# Patient Record
Sex: MALE | Race: WHITE | NOT HISPANIC OR LATINO | URBAN - METROPOLITAN AREA
[De-identification: names, ages, dates, MRNs, and addresses within clinical notes are randomized per-mention and may not be internally consistent; named-entity substitution may affect disease eponyms.]

---

## 2018-01-08 ENCOUNTER — EMERGENCY (EMERGENCY)
Facility: HOSPITAL | Age: 33
LOS: 1 days | Discharge: HOME | End: 2018-01-08
Attending: EMERGENCY MEDICINE | Admitting: EMERGENCY MEDICINE
Payer: COMMERCIAL

## 2018-01-08 VITALS
TEMPERATURE: 99 F | HEART RATE: 91 BPM | RESPIRATION RATE: 20 BRPM | DIASTOLIC BLOOD PRESSURE: 77 MMHG | OXYGEN SATURATION: 99 % | SYSTOLIC BLOOD PRESSURE: 136 MMHG

## 2018-01-08 VITALS
TEMPERATURE: 98 F | OXYGEN SATURATION: 100 % | HEART RATE: 90 BPM | SYSTOLIC BLOOD PRESSURE: 140 MMHG | DIASTOLIC BLOOD PRESSURE: 70 MMHG | RESPIRATION RATE: 16 BRPM

## 2018-01-08 LAB
ALBUMIN SERPL ELPH-MCNC: 4 G/DL — SIGNIFICANT CHANGE UP (ref 3.3–5)
ALP SERPL-CCNC: 44 U/L — SIGNIFICANT CHANGE UP (ref 40–120)
ALT FLD-CCNC: 54 U/L — HIGH (ref 4–41)
APTT BLD: 25.8 SEC — LOW (ref 27.5–37.4)
AST SERPL-CCNC: 41 U/L — HIGH (ref 4–40)
BASOPHILS # BLD AUTO: 0.02 K/UL — SIGNIFICANT CHANGE UP (ref 0–0.2)
BASOPHILS NFR BLD AUTO: 0.2 % — SIGNIFICANT CHANGE UP (ref 0–2)
BILIRUB SERPL-MCNC: 0.3 MG/DL — SIGNIFICANT CHANGE UP (ref 0.2–1.2)
BLD GP AB SCN SERPL QL: NEGATIVE — SIGNIFICANT CHANGE UP
BUN SERPL-MCNC: 16 MG/DL — SIGNIFICANT CHANGE UP (ref 7–23)
CALCIUM SERPL-MCNC: 8.8 MG/DL — SIGNIFICANT CHANGE UP (ref 8.4–10.5)
CHLORIDE SERPL-SCNC: 99 MMOL/L — SIGNIFICANT CHANGE UP (ref 98–107)
CO2 SERPL-SCNC: 25 MMOL/L — SIGNIFICANT CHANGE UP (ref 22–31)
CREAT SERPL-MCNC: 1.21 MG/DL — SIGNIFICANT CHANGE UP (ref 0.5–1.3)
EOSINOPHIL # BLD AUTO: 0.06 K/UL — SIGNIFICANT CHANGE UP (ref 0–0.5)
EOSINOPHIL NFR BLD AUTO: 0.5 % — SIGNIFICANT CHANGE UP (ref 0–6)
GLUCOSE SERPL-MCNC: 83 MG/DL — SIGNIFICANT CHANGE UP (ref 70–99)
HCT VFR BLD CALC: 46.8 % — SIGNIFICANT CHANGE UP (ref 39–50)
HGB BLD-MCNC: 15.7 G/DL — SIGNIFICANT CHANGE UP (ref 13–17)
IMM GRANULOCYTES # BLD AUTO: 0.04 # — SIGNIFICANT CHANGE UP
IMM GRANULOCYTES NFR BLD AUTO: 0.3 % — SIGNIFICANT CHANGE UP (ref 0–1.5)
INR BLD: 1.06 — SIGNIFICANT CHANGE UP (ref 0.88–1.17)
LYMPHOCYTES # BLD AUTO: 1.58 K/UL — SIGNIFICANT CHANGE UP (ref 1–3.3)
LYMPHOCYTES # BLD AUTO: 13.6 % — SIGNIFICANT CHANGE UP (ref 13–44)
MCHC RBC-ENTMCNC: 27.1 PG — SIGNIFICANT CHANGE UP (ref 27–34)
MCHC RBC-ENTMCNC: 33.5 % — SIGNIFICANT CHANGE UP (ref 32–36)
MCV RBC AUTO: 80.8 FL — SIGNIFICANT CHANGE UP (ref 80–100)
MONOCYTES # BLD AUTO: 1.43 K/UL — HIGH (ref 0–0.9)
MONOCYTES NFR BLD AUTO: 12.3 % — SIGNIFICANT CHANGE UP (ref 2–14)
NEUTROPHILS # BLD AUTO: 8.45 K/UL — HIGH (ref 1.8–7.4)
NEUTROPHILS NFR BLD AUTO: 73.1 % — SIGNIFICANT CHANGE UP (ref 43–77)
NRBC # FLD: 0 — SIGNIFICANT CHANGE UP
PLATELET # BLD AUTO: 282 K/UL — SIGNIFICANT CHANGE UP (ref 150–400)
PMV BLD: 11.4 FL — SIGNIFICANT CHANGE UP (ref 7–13)
POTASSIUM SERPL-MCNC: 4.6 MMOL/L — SIGNIFICANT CHANGE UP (ref 3.5–5.3)
POTASSIUM SERPL-SCNC: 4.6 MMOL/L — SIGNIFICANT CHANGE UP (ref 3.5–5.3)
PROT SERPL-MCNC: 7.4 G/DL — SIGNIFICANT CHANGE UP (ref 6–8.3)
PROTHROM AB SERPL-ACNC: 11.8 SEC — SIGNIFICANT CHANGE UP (ref 9.8–13.1)
RBC # BLD: 5.79 M/UL — SIGNIFICANT CHANGE UP (ref 4.2–5.8)
RBC # FLD: 15.9 % — HIGH (ref 10.3–14.5)
RH IG SCN BLD-IMP: NEGATIVE — SIGNIFICANT CHANGE UP
SODIUM SERPL-SCNC: 138 MMOL/L — SIGNIFICANT CHANGE UP (ref 135–145)
WBC # BLD: 11.58 K/UL — HIGH (ref 3.8–10.5)
WBC # FLD AUTO: 11.58 K/UL — HIGH (ref 3.8–10.5)

## 2018-01-08 PROCEDURE — 99284 EMERGENCY DEPT VISIT MOD MDM: CPT

## 2018-01-08 NOTE — ED PROVIDER NOTE - PROGRESS NOTE DETAILS
Katie JASMINE: Patient reassessed.  Doing well; no concerns.  PE does not show an erection at this time.  Has been over > 1 hour since the last procedure.  He has been urinating w/o difficulty.  Discharge instructions and follow-up discussed with patient. Klepfish: Cleared by  for outpt f/u (also requested just one dose abx). Comfortable for dc, outpt pmd/gu f/u.

## 2018-01-08 NOTE — ED PROVIDER NOTE - ATTENDING CONTRIBUTION TO CARE
I, Jennifer Cabot, MD, have performed a history and physical exam of the patient and discussed their management with the resident. I reviewed the resident's note and agree with the documented findings and plan of care. My medical decision making and observations are found above.    Cabot: 33M with no PMH on testosterone and anastrozole for improving his weight lifting who comes in with priapism x 12 hours.  Reports that his penis is painful, cooler, and changing color.  Denies taking other medications, trauma, and prior episodes.  On exam, HDS, NAD, + priapism, slightly dusky, cooler to touch.  Will send preop labs, call uro for urgent aspiration and likely phenylephrine injection.

## 2018-01-08 NOTE — ED PROVIDER NOTE - CARE PLAN
Principal Discharge DX:	Priapism Principal Discharge DX:	Priapism  Instructions for follow-up, activity and diet:	Follow-up with your Primary Care Physician within 24-48 hours.  Please return to the Emergency Department immediately for any new, worsening or concerning symptoms; specifically those included in the attached information brochure.     Your lab work results are attached along with your discharge paperwork.  Please follow-up with urology within 5 - 7 days.  If you begin to develop similar symptoms, fever, penile redness, or any other concerning signs/symptoms.  Please return to the Emergency Department for reevaluation.

## 2018-01-08 NOTE — CONSULT NOTE ADULT - PROBLEM SELECTOR RECOMMENDATION 9
successful needle aspiration and administration of 200mcg phenylephrine  observe to 1-2am, if no further recurrence can discharge to home  ancef 2g for periprocedural antibiotics  testosterone level  discontinue testosterone supplementation  follow-up with Dr. Emery at the [facility], [phone] in 2 weeks  return to ED between 4-6 hours if additional episode occurrs

## 2018-01-08 NOTE — CONSULT NOTE ADULT - ASSESSMENT
32 yo M taking testosterone supplementation of unknown dose presents with 11 hours priapism, quickly flaccid after needle aspiration

## 2018-01-08 NOTE — ED PROVIDER NOTE - OBJECTIVE STATEMENT
33 year-old male on testosterone and anastrozole for weight lifting presents to the Emergency Department for priapism.  Patient mentions he has had an erection since 12PM; approx. 10 hours. 33 year-old male on testosterone and anastrozole for weight lifting presents to the Emergency Department for priapism.  Patient mentions he has had an erection since 12PM; approx. 10 hours.  Duration of erection  Prior episodes  Medications  Use of recreational drugs  History of hematologic disease, especially sickle cell disease  Penile or perineal trauma  Presence and severity of pain 33 year-old male on testosterone and anastrozole for weight lifting (illegally) presents to the Emergency Department for priapism.  Patient mentions he has had an erection since 11AM; approx. 11-12 hours.  Since then he has not had any detumescence; mentions pain comes and goes.  No prior episodes of priapism.  No history of any blood disorders, penile trauma.  No fevers, chills, nausea, vomiting, chest pain, shortness of breath.  He is able to urinate.

## 2018-01-08 NOTE — ED PROVIDER NOTE - PLAN OF CARE
Follow-up with your Primary Care Physician within 24-48 hours.  Please return to the Emergency Department immediately for any new, worsening or concerning symptoms; specifically those included in the attached information brochure.     Your lab work results are attached along with your discharge paperwork.  Please follow-up with urology within 5 - 7 days.  If you begin to develop similar symptoms, fever, penile redness, or any other concerning signs/symptoms.  Please return to the Emergency Department for reevaluation.

## 2018-01-08 NOTE — ED PROVIDER NOTE - MEDICAL DECISION MAKING DETAILS
33 year-old male on testosterone and anastrozole for weight lifting presents to the Emergency Department for erection ~ likely priapism from illicit drug use.  No history of penile trauma, blood d/o.  Plan to get Urology consult, CBC, CMP, and coags. 33 year-old male on testosterone and anastrozole for weight lifting presents to the Emergency Department for erection ~ likely priapism from illicit drug use.  No history of penile trauma, blood d/o.  Plan to get Urology consult, CBC, CMP, and coags.    Cabot: 33M with no PMH on testosterone and anastrozole for improving his weight lifting who comes in with priapism x 12 hours.  Reports that his penis is painful, cooler, and changing color.  Denies taking other medications, trauma, and prior episodes.  On exam, HDS, NAD, + priapism, slightly dusky, cooler to touch.  Will send preop labs, call uro for aspiration and likely phenylephrine injection.

## 2018-01-08 NOTE — ED PROVIDER NOTE - PENIS
circumcised/+ erection + erection; penis looks pale and discolored (Chaperone: Hortencia Dove)/circumcised

## 2018-01-08 NOTE — ED ADULT NURSE NOTE - OBJECTIVE STATEMENT
pt received to room 8 pt is A&0x3 pt comes to ED for an erection since noon this afternoon. pt states he has been taking weight lifting meds to bulk up pt still has an erection at this time. pt complains of pain on urination and states his penis feels slightly cold. pt VSS 18 g placed in L AC labs were drawn and sent EDMD at bedside for eval Will continue to monitor the pt

## 2018-01-08 NOTE — ED ADULT TRIAGE NOTE - CHIEF COMPLAINT QUOTE
Pt. presents to the ED with priaprism since 12 noon. Pt. is taking testosterone and rimedex for weight lifting. c/o pain in penile area.

## 2018-01-08 NOTE — CONSULT NOTE ADULT - SUBJECTIVE AND OBJECTIVE BOX
HPI    Mr. Belcher is a 32 yo M with no significant medical or surgical history presents with first episode of priapism. This morning had an erection upon waking and did not go away. Has been feeling under the weather and stayed home, now presents approximately 11 hours duration of erection. States erection has been 100% constantly throughout this time. He admits to taking testosterone injections for performance enhancing effect and anastrozole, neither of which are prescribed by a physician. He saw a doctor several months ago when a testosterone level was checked, and he cut back on the dosage with intent to discontinue usage. He is otherwise not taking any medications or supplements and has never had a long lasting erection prior to this episode.    He denies any personal or family history of hematologic disorders, any other drug use, trauma to the area, or intracavernosal injection use.      PAST MEDICAL & SURGICAL HISTORY:  OCD (obsessive compulsive disorder)      MEDICATIONS  (STANDING):  unprescribed testosterone/anastrozole in unknown dosing    MEDICATIONS  (PRN):      FAMILY HISTORY:  noncontributory    Allergies    No Known Allergies    Intolerances      SOCIAL HISTORY:  taking testosterone and anastrozole    REVIEW OF SYSTEMS: Otherwise negative as stated in HPI    Physical Exam  Vital signs  T(C): 36.7 (01-08-18 @ 22:08), Max: 37.3 (01-08-18 @ 21:25)  HR: 90 (01-08-18 @ 22:08)  BP: 140/70 (01-08-18 @ 22:08)  SpO2: 100% (01-08-18 @ 22:08)  Wt(kg): --    Output    UOP    Gen:  NAD    Pulm:  No respiratory distress  	  CV:  RRR    GI:  S/ND/NT    :  Phallus 100% erect, after procedure wrapped in coban pressure dressing approximately 50% erect. Please see separate procedure note    MSK:      LABS:      01-08 @ 21:40    WBC 11.58 / Hct 46.8  / SCr 1.21     01-08    138  |  99  |  16  ----------------------------<  83  4.6   |  25  |  1.21    Ca    8.8      08 Jan 2018 21:40    TPro  7.4  /  Alb  4.0  /  TBili  0.3  /  DBili  x   /  AST  41<H>  /  ALT  54<H>  /  AlkPhos  44  01-08    PT/INR - ( 08 Jan 2018 21:40 )   PT: 11.8 SEC;   INR: 1.06          PTT - ( 08 Jan 2018 21:40 )  PTT:25.8 SEC      Urine Cx:  Blood Cx:    RADIOLOGY: HPI  Mr. Belcher is a 32 yo M with no significant medical or surgical history presents with first episode of priapism. This morning had an erection upon waking and did not go away. Has been feeling under the weather and stayed home, now presents approximately 11 hours duration of erection. States erection has been 100% constantly throughout this time. He admits to taking testosterone injections for performance enhancing effect and anastrozole, neither of which are prescribed by a physician. He saw a doctor several months ago when a testosterone level was checked, and he cut back on the dosage with intent to discontinue usage. He is otherwise not taking any medications or supplements and has never had a long lasting erection prior to this episode.    He denies any personal or family history of hematologic disorders, any other drug use, trauma to the area, or intracavernosal injection use.      PAST MEDICAL & SURGICAL HISTORY:  OCD (obsessive compulsive disorder)      MEDICATIONS  (STANDING):  unprescribed testosterone/anastrozole in unknown dosing    MEDICATIONS  (PRN):      FAMILY HISTORY:  noncontributory    Allergies    No Known Allergies    Intolerances      SOCIAL HISTORY:  taking testosterone and anastrozole    REVIEW OF SYSTEMS: Otherwise negative as stated in HPI    Physical Exam  Vital signs  T(C): 36.7 (01-08-18 @ 22:08), Max: 37.3 (01-08-18 @ 21:25)  HR: 90 (01-08-18 @ 22:08)  BP: 140/70 (01-08-18 @ 22:08)  SpO2: 100% (01-08-18 @ 22:08)  Wt(kg): --    Output    UOP    Gen:  NAD    Pulm:  No respiratory distress  	  CV:  RRR    GI:  S/ND/NT    :  Phallus 100% erect, after procedure wrapped in coban pressure dressing approximately 50% erect. Please see separate procedure note  Scrotum without edema, normal testicular volume, nontender    MSK:  ambulates without difficulty    LABS:      01-08 @ 21:40    WBC 11.58 / Hct 46.8  / SCr 1.21     01-08    138  |  99  |  16  ----------------------------<  83  4.6   |  25  |  1.21    Ca    8.8      08 Jan 2018 21:40    TPro  7.4  /  Alb  4.0  /  TBili  0.3  /  DBili  x   /  AST  41<H>  /  ALT  54<H>  /  AlkPhos  44  01-08    PT/INR - ( 08 Jan 2018 21:40 )   PT: 11.8 SEC;   INR: 1.06          PTT - ( 08 Jan 2018 21:40 )  PTT:25.8 SEC      Urine Cx: none  Blood Cx: none    RADIOLOGY: none

## 2018-01-09 DIAGNOSIS — N48.30 PRIAPISM, UNSPECIFIED: ICD-10-CM

## 2018-01-09 LAB
ALBUMIN SERPL ELPH-MCNC: 3.9 G/DL — SIGNIFICANT CHANGE UP (ref 3.3–5)
ALP SERPL-CCNC: 39 U/L — LOW (ref 40–120)
ALT FLD-CCNC: 51 U/L — HIGH (ref 4–41)
AST SERPL-CCNC: 35 U/L — SIGNIFICANT CHANGE UP (ref 4–40)
BILIRUB SERPL-MCNC: 0.2 MG/DL — SIGNIFICANT CHANGE UP (ref 0.2–1.2)
BUN SERPL-MCNC: 17 MG/DL — SIGNIFICANT CHANGE UP (ref 7–23)
CALCIUM SERPL-MCNC: 8.8 MG/DL — SIGNIFICANT CHANGE UP (ref 8.4–10.5)
CHLORIDE SERPL-SCNC: 104 MMOL/L — SIGNIFICANT CHANGE UP (ref 98–107)
CO2 SERPL-SCNC: 25 MMOL/L — SIGNIFICANT CHANGE UP (ref 22–31)
CREAT SERPL-MCNC: 1.08 MG/DL — SIGNIFICANT CHANGE UP (ref 0.5–1.3)
GLUCOSE SERPL-MCNC: 83 MG/DL — SIGNIFICANT CHANGE UP (ref 70–99)
POTASSIUM SERPL-MCNC: 4.8 MMOL/L — SIGNIFICANT CHANGE UP (ref 3.5–5.3)
POTASSIUM SERPL-SCNC: 4.8 MMOL/L — SIGNIFICANT CHANGE UP (ref 3.5–5.3)
PROT SERPL-MCNC: 6.8 G/DL — SIGNIFICANT CHANGE UP (ref 6–8.3)
SODIUM SERPL-SCNC: 143 MMOL/L — SIGNIFICANT CHANGE UP (ref 135–145)

## 2018-01-09 RX ORDER — CEFAZOLIN SODIUM 1 G
1000 VIAL (EA) INJECTION ONCE
Qty: 0 | Refills: 0 | Status: COMPLETED | OUTPATIENT
Start: 2018-01-09 | End: 2018-01-09

## 2018-01-09 RX ADMIN — Medication 100 MILLIGRAM(S): at 00:33

## 2018-01-09 NOTE — PROCEDURE NOTE - PROCEDURE
<<-----Click on this checkbox to enter Procedure Aspiration of corpora cavernosa for priapism  01/09/2018    Active  DANEAS

## 2018-01-09 NOTE — PROCEDURE NOTE - ADDITIONAL PROCEDURE DETAILS
after achieving good anesthetic effect, an 18 gauge spinal needle was placed in the mid-corporal body on the R side at 9 o'clock taking care to remain well away from the urethra. Approximately 40cc dark venous blood was easily aspirated while pressure applied to phallus. Upon completion phallus was approximately 50 percent erect. After 30 minutes the phallus had become erect again, and after cleaning the left corpora with alcohol, 200 mcg phenylephrine were injected with immiadiate response. After 20 minutes the phallus remained flaccid.

## 2018-01-09 NOTE — PROCEDURE NOTE - GENERAL PROCEDURE DETAILS
Using sterile technique and aspirating before each injection, a dorsal nerve block and ring block were performed to anesthetize the area using 1% lidocaine and a 22 gauge needle. 18cc lidocaine were used.

## 2018-01-11 LAB
TESTOST FLD-SCNC: 1032.1 NG/DL — HIGH (ref 264–916)
TESTOSTERONE.FREE+WB SERPL-MCNC: 46.7 PG/ML — HIGH (ref 8.7–25.1)

## 2018-05-01 ENCOUNTER — APPOINTMENT (OUTPATIENT)
Dept: NEUROLOGY | Facility: CLINIC | Age: 33
End: 2018-05-01
Payer: COMMERCIAL

## 2018-05-01 VITALS
DIASTOLIC BLOOD PRESSURE: 79 MMHG | WEIGHT: 170 LBS | OXYGEN SATURATION: 97 % | HEART RATE: 67 BPM | HEIGHT: 66 IN | BODY MASS INDEX: 27.32 KG/M2 | SYSTOLIC BLOOD PRESSURE: 131 MMHG

## 2018-05-01 DIAGNOSIS — R20.0 ANESTHESIA OF SKIN: ICD-10-CM

## 2018-05-01 DIAGNOSIS — Z72.0 TOBACCO USE: ICD-10-CM

## 2018-05-01 DIAGNOSIS — R53.1 WEAKNESS: ICD-10-CM

## 2018-05-01 DIAGNOSIS — Z86.79 PERSONAL HISTORY OF OTHER DISEASES OF THE CIRCULATORY SYSTEM: ICD-10-CM

## 2018-05-01 DIAGNOSIS — R20.2 ANESTHESIA OF SKIN: ICD-10-CM

## 2018-05-01 DIAGNOSIS — Z83.3 FAMILY HISTORY OF DIABETES MELLITUS: ICD-10-CM

## 2018-05-01 DIAGNOSIS — Z78.9 OTHER SPECIFIED HEALTH STATUS: ICD-10-CM

## 2018-05-01 DIAGNOSIS — F41.9 ANXIETY DISORDER, UNSPECIFIED: ICD-10-CM

## 2018-05-01 DIAGNOSIS — F32.9 ANXIETY DISORDER, UNSPECIFIED: ICD-10-CM

## 2018-05-01 DIAGNOSIS — M00.20: ICD-10-CM

## 2018-05-01 DIAGNOSIS — M79.5 RESIDUAL FOREIGN BODY IN SOFT TISSUE: ICD-10-CM

## 2018-05-01 PROCEDURE — 99205 OFFICE O/P NEW HI 60 MIN: CPT

## 2018-05-02 PROBLEM — Z72.0 TRIVIAL CIGARETTE SMOKER (LESS THAN 1 PER DAY): Status: ACTIVE | Noted: 2018-05-02

## 2018-05-02 PROBLEM — F41.9 ANXIETY AND DEPRESSION: Status: RESOLVED | Noted: 2018-05-02 | Resolved: 2018-05-02

## 2018-05-02 PROBLEM — Z78.9 SOCIAL ALCOHOL USE: Status: ACTIVE | Noted: 2018-05-02

## 2018-05-02 PROBLEM — M00.20: Status: RESOLVED | Noted: 2018-05-02 | Resolved: 2018-05-02

## 2018-05-02 PROBLEM — Z86.79 HISTORY OF CARDIAC MURMUR: Status: RESOLVED | Noted: 2018-05-02 | Resolved: 2018-05-02

## 2018-05-02 PROBLEM — Z83.3 FAMILY HISTORY OF DIABETES MELLITUS: Status: ACTIVE | Noted: 2018-05-02

## 2018-05-16 ENCOUNTER — OTHER (OUTPATIENT)
Age: 33
End: 2018-05-16

## 2018-05-16 PROBLEM — M79.5 RETAINED BULLET: Status: ACTIVE | Noted: 2018-05-16

## 2018-06-14 ENCOUNTER — APPOINTMENT (OUTPATIENT)
Dept: NEUROLOGY | Facility: CLINIC | Age: 33
End: 2018-06-14

## 2019-02-14 ENCOUNTER — RESULT REVIEW (OUTPATIENT)
Age: 34
End: 2019-02-14

## 2019-08-25 ENCOUNTER — EMERGENCY (EMERGENCY)
Facility: HOSPITAL | Age: 34
LOS: 1 days | Discharge: HOME | End: 2019-08-25
Attending: EMERGENCY MEDICINE | Admitting: EMERGENCY MEDICINE
Payer: COMMERCIAL

## 2019-08-25 VITALS
RESPIRATION RATE: 16 BRPM | TEMPERATURE: 98 F | SYSTOLIC BLOOD PRESSURE: 168 MMHG | HEART RATE: 77 BPM | OXYGEN SATURATION: 100 % | DIASTOLIC BLOOD PRESSURE: 81 MMHG

## 2019-08-25 VITALS
HEART RATE: 90 BPM | SYSTOLIC BLOOD PRESSURE: 147 MMHG | RESPIRATION RATE: 18 BRPM | OXYGEN SATURATION: 100 % | DIASTOLIC BLOOD PRESSURE: 70 MMHG | TEMPERATURE: 98 F

## 2019-08-25 PROBLEM — F42.9 OBSESSIVE-COMPULSIVE DISORDER, UNSPECIFIED: Chronic | Status: ACTIVE | Noted: 2018-01-08

## 2019-08-25 LAB
BASE EXCESS BLDV CALC-SCNC: -2.1 MMOL/L — SIGNIFICANT CHANGE UP
BLOOD GAS VENOUS - CREATININE: 1.12 MG/DL — SIGNIFICANT CHANGE UP (ref 0.5–1.3)
CHLORIDE BLDV-SCNC: 111 MMOL/L — HIGH (ref 96–108)
GAS PNL BLDV: 135 MMOL/L — LOW (ref 136–146)
GLUCOSE BLDV-MCNC: 64 MG/DL — LOW (ref 70–99)
HCO3 BLDV-SCNC: 21 MMOL/L — SIGNIFICANT CHANGE UP (ref 20–27)
HCT VFR BLDV CALC: 49.7 % — SIGNIFICANT CHANGE UP (ref 39–51)
HGB BLDV-MCNC: 16.3 G/DL — SIGNIFICANT CHANGE UP (ref 13–17)
LACTATE BLDV-MCNC: 6.3 MMOL/L — CRITICAL HIGH (ref 0.5–2)
PCO2 BLDV: 57 MMHG — HIGH (ref 41–51)
PH BLDV: 7.25 PH — LOW (ref 7.32–7.43)
PO2 BLDV: 58 MMHG — HIGH (ref 35–40)
POTASSIUM BLDV-SCNC: 4.7 MMOL/L — HIGH (ref 3.4–4.5)
SAO2 % BLDV: 84.2 % — SIGNIFICANT CHANGE UP (ref 60–85)

## 2019-08-25 PROCEDURE — 54220 IRRG CRPRA CAVRNOSA PRIAPISM: CPT

## 2019-08-25 PROCEDURE — 99283 EMERGENCY DEPT VISIT LOW MDM: CPT | Mod: 25

## 2019-08-25 RX ORDER — PHENYLEPHRINE HYDROCHLORIDE 10 MG/ML
1 INJECTION INTRAVENOUS ONCE
Refills: 0 | Status: COMPLETED | OUTPATIENT
Start: 2019-08-25 | End: 2019-08-25

## 2019-08-25 RX ORDER — ACETAMINOPHEN 500 MG
650 TABLET ORAL ONCE
Refills: 0 | Status: COMPLETED | OUTPATIENT
Start: 2019-08-25 | End: 2019-08-25

## 2019-08-25 RX ADMIN — Medication 650 MILLIGRAM(S): at 12:04

## 2019-08-25 RX ADMIN — PHENYLEPHRINE HYDROCHLORIDE 1 MILLIGRAM(S): 10 INJECTION INTRAVENOUS at 12:05

## 2019-08-25 NOTE — PROCEDURE NOTE - NSPERFORMEDBY_GEN_A_CORE
[FreeTextEntry1] : The patient is of average risk for colorectal cancer.  Will schedule routine screening colonoscopy.  TriLyte prep ordered. \par \par The description of the colonoscopy procedure was discussed in depth as were the alternatives and risks, the alternatives including a barium enema, a CT scan, a CT colonography, or stool testing (hemoccult/FIT).  It was explained that although these alternatives may be useful and may give general information about problems within the colon, but they do not provide the in-depth information, direct visibility and the ability to resect polyps as a colonoscopy does.  The risks were thoroughly described and may include but are not limited to: bleeding (immediate or up to 14 days after the procedure), missed polyps and/or cancer that may not be seen during the procedure, rectal irritation, medication reaction (to sedation, or any other medications administered), irritation of the vein used for IV medication, infection, tear or perforation of the colon and rectum, abdominal bloating and cramping.  Additionally discussed were rare complications that may require additional treatment such as surgery, hospitalization, repeat endoscopy and/or blood transfusion.  Lastly, mentioned is a small risk of cardiopulmonary events such as loss of breathing and heart rhythm disturbances including rare cardiopulmonary arrest. \par 
Myself

## 2019-08-25 NOTE — ED PROVIDER NOTE - NSTIMEPROVIDERCAREINITIATE_GEN_ER
Pt was at [place] and removed 4 ticks from multiple sites on body, concerned there may be more on his body. 25-Aug-2019 11:17

## 2019-08-25 NOTE — ED ADULT TRIAGE NOTE - CHIEF COMPLAINT QUOTE
states" I am having priapism since few hours" h/o priapism in the past. takes Seroquel and xanax and smokes marijuana

## 2019-08-25 NOTE — ED PROVIDER NOTE - OBJECTIVE STATEMENT
35yo M h/o priapism x1 p/w priapism since last night when he was sleeping. complaining of pain. denies taking any medications or supplements. had priapism once before which required drainage and phenylephrine.

## 2019-08-25 NOTE — ED PROVIDER NOTE - PROGRESS NOTE DETAILS
Norman Chavez D.O., PGY1 (Resident)   Lab called. Patient lactate 6.1. Aware, will monitor. Cadence Patel MD: attempted needle aspiration/drainage with 20cc blood aspirated. flushed with saline and phenylephrine injected. able to aspirate another 20cc for total of 40cc drainaged. pt still with priapism, urology consulted and drained by urology. VBG from stagnant blood in penis, lactate and pH expected from stagnant blood.

## 2019-08-25 NOTE — ED PROVIDER NOTE - CARE PROVIDER_API CALL
Marcelino Reinoso)  Urology  [address] [place] [address] [address]  [place] [address]  Phone: [phone]  Fax: [phone]  Follow Up Time: 1-3 Days

## 2019-08-25 NOTE — ED PROVIDER NOTE - ATTENDING CONTRIBUTION TO CARE
I performed a face to face bedside interview with patient regarding history of present illness, review of symptoms and past medical history. I completed an independent physical exam.  I have discussed patient's plan of care.   I agree with note as stated above, having amended the EMR as needed to reflect my findings. I have discussed the assessment and plan of care.  This includes during the time I functioned as the attending physician for this patient.  Attending Contribution to Care: agree with plan of resident. pt p/w priapism since last night. admits to mild pain at site. no skin changes, stopped taking trazadone so no obvious reason for priapism. denies taking any viagra, cialis, no fam hx of priaprism, drained by dr franklin, then by urology for complete resolution of symptoms. d/c with keflex, sudafed. pt stable for d/c with urology f/u.

## 2019-08-25 NOTE — ED PROVIDER NOTE - NSFOLLOWUPINSTRUCTIONS_ED_ALL_ED_FT
1) Please follow-up with Dr Marcelino Reinoso within the next 3 days.  Please call today or tomorrow for an appointment.  If you cannot follow-up with your doctor(s), please return to the ED for any urgent issues.  2) If you have any worsening of symptoms or any other concerns please return to the ED immediately.  3) Please continue taking your home medications as directed.  4) You may have been given a copy of your labs and/or imaging.  Please go over these with your primary care doctor.

## 2019-08-25 NOTE — ED PROCEDURE NOTE - PROCEDURE ADDITIONAL DETAILS
dorsal nerve block with ring block achieved with lidocaine, attempted priapism aspiration and drainge, able to obtain 20cc during intial aspiration, phenylephrine (total 3cc) injected with another 20cc aspiration of old blood with a total of 40cc aspirated.

## 2019-08-25 NOTE — ED PROVIDER NOTE - NS ED ROS FT
Gen: No fever, normal appetite  Eyes: No eye irritation or discharge  ENT: No earpain, congestion, sore throat  Resp: No cough or trouble breathing  Cardiovascular: No chest pain or palpitation  Gastroenteric: No nausea/vomiting, diarrhea, constipation  : +priapism  MS: No joint or muscle pain  Skin: No rashes  Neuro: No headache  Remainder negative, except as per the HPI

## 2019-08-25 NOTE — ED ADULT NURSE NOTE - OBJECTIVE STATEMENT
pt received to 10, aox3, pt c/o erection since last night with pain.  pt reports this has happened to him in the past and has required intervention.  pt denies taking any meds and supplements.  MD at bedside, awaitign further orders

## 2019-08-25 NOTE — PROCEDURE NOTE - ADDITIONAL PROCEDURE DETAILS
Called by ED to evaluate priapism after failed drainage by ED earlier.  ED team performed a dorsal block, aspiration, and injection of 300 mcg of phenylephrine but still with persistent priapism.  On evaluation, penis was 80% erect, rigid, and painful.  Repeat dorsal block and ring block performed with 2% lidocaine.  18g needle used to aspirate left corporal body with care to avoid the urethra.  Drainage of 250-300cc of dark viscous blood, blood gas sent.  Blood became lighter in color, at this point 500 mcg of phenylephrine were slowly injected over 15 minutes.  Penis became ~ 20-30% rigid, pliable.  On repeat examination in 1 hour had ~ 50% rigidity, wiped with an alcohol swab and re-aspirated with a 23g butterfly needle and became 20-30% erect again and remained soft.  pH of Blood gas on first attempt was 7.2 indicating re-oxygenation of the penis likely from the ED team's first attempt.    Patient to follow up with Dr. Reinoso at the [facility] [phone].
no

## 2019-09-04 ENCOUNTER — EMERGENCY (EMERGENCY)
Facility: HOSPITAL | Age: 34
LOS: 1 days | Discharge: HOME | End: 2019-09-04
Attending: EMERGENCY MEDICINE | Admitting: EMERGENCY MEDICINE
Payer: COMMERCIAL

## 2019-09-04 ENCOUNTER — APPOINTMENT (OUTPATIENT)
Dept: UROLOGY | Facility: CLINIC | Age: 34
End: 2019-09-04
Payer: COMMERCIAL

## 2019-09-04 VITALS
TEMPERATURE: 98 F | HEART RATE: 79 BPM | SYSTOLIC BLOOD PRESSURE: 143 MMHG | RESPIRATION RATE: 18 BRPM | OXYGEN SATURATION: 99 % | DIASTOLIC BLOOD PRESSURE: 89 MMHG

## 2019-09-04 VITALS
BODY MASS INDEX: 28.12 KG/M2 | DIASTOLIC BLOOD PRESSURE: 90 MMHG | OXYGEN SATURATION: 96 % | SYSTOLIC BLOOD PRESSURE: 145 MMHG | HEART RATE: 81 BPM | TEMPERATURE: 98.7 F | WEIGHT: 175 LBS | HEIGHT: 66 IN

## 2019-09-04 VITALS
RESPIRATION RATE: 18 BRPM | SYSTOLIC BLOOD PRESSURE: 135 MMHG | DIASTOLIC BLOOD PRESSURE: 72 MMHG | TEMPERATURE: 98 F | OXYGEN SATURATION: 100 % | HEART RATE: 82 BPM

## 2019-09-04 DIAGNOSIS — Z72.89 OTHER PROBLEMS RELATED TO LIFESTYLE: ICD-10-CM

## 2019-09-04 DIAGNOSIS — Z80.1 FAMILY HISTORY OF MALIGNANT NEOPLASM OF TRACHEA, BRONCHUS AND LUNG: ICD-10-CM

## 2019-09-04 PROCEDURE — 93980 PENILE VASCULAR STUDY: CPT

## 2019-09-04 PROCEDURE — 99203 OFFICE O/P NEW LOW 30 MIN: CPT | Mod: 25

## 2019-09-04 PROCEDURE — 99283 EMERGENCY DEPT VISIT LOW MDM: CPT

## 2019-09-04 RX ORDER — LIDOCAINE HCL 20 MG/ML
5 VIAL (ML) INJECTION ONCE
Refills: 0 | Status: DISCONTINUED | OUTPATIENT
Start: 2019-09-04 | End: 2019-09-04

## 2019-09-04 RX ORDER — PHENYLEPHRINE HYDROCHLORIDE 10 MG/ML
5 INJECTION INTRAVENOUS ONCE
Refills: 0 | Status: DISCONTINUED | OUTPATIENT
Start: 2019-09-04 | End: 2019-09-04

## 2019-09-04 NOTE — ED PROVIDER NOTE - PATIENT PORTAL LINK FT
You can access the FollowMyHealth Patient Portal offered by [facility] by registering at the following website: [email or web address]. By joining [employer]’s FollowMyHealth portal, you will also be able to view your health information using other applications (apps) compatible with our system.

## 2019-09-04 NOTE — ED ADULT TRIAGE NOTE - CHIEF COMPLAINT QUOTE
priapism since 9pm.  denies pain. priapism since 9pm.  denies pain.  Had priapism 8/25/19 which required drainage and phenylephrine.

## 2019-09-04 NOTE — ED PROVIDER NOTE - GENITOURINARY, MLM
No discharge, lesions. Penis warm, well perfused. Exam performed by Dr. Malcom Perez and Dr. Lawson Child. No discharge, lesions. Penis warm, well perfused. Penis is not fully erect. Penis is normal in appearance and color. Exam performed by Dr. Malcom Perez and Dr. Lawson Child.

## 2019-09-04 NOTE — ED PROVIDER NOTE - NSFOLLOWUPINSTRUCTIONS_ED_ALL_ED_FT
Please follow up with the [facility] as soon as possible. You can make an appointment at: [facility]  Phone: [phone]  Address: [address] [place] [address]     Please return to the emergency department if you experience worsening symptoms, or if you develop headache, neck stiffness, fever/chills, changes in vision, chest pain, shortness of breath, difficulty breathing, palpitations, lightheadedness, weakness, dizziness, numbness, tingling, abdominal pain, nausea, vomiting, diarrhea, changes in your urine, blood in the urine, painful urination, syncope (passing out), or for any other concerns.

## 2019-09-04 NOTE — ED PROVIDER NOTE - CLINICAL SUMMARY MEDICAL DECISION MAKING FREE TEXT BOX
35 yo M, hx of priapism, presenting with concerns for not fully flacid penis. Penis is warm and well perfused, not fully erect, and is not causing any pain to patient. Patient is urinating normally, and plans to make appointment with urology for later today. Will discharge patient home w/ urology followup.

## 2019-09-04 NOTE — ED ADULT NURSE NOTE - OBJECTIVE STATEMENT
pt received in rm 6 AAO x 3. pt with h/o priapism reports having intercourse with girlfriend around 8/9pm yesterday and noticed he was having priapism. pt received in rm 6 AAO x 3. pt reports priapism after having intercourse with girlfriend around 8/9pm yesterday. pt states this had happened to him before while taking testosterone but has not taken supplements since. pt denies pain to area, sob, chest pain, n/v/d, fevers, chills at this time. respirations even and unlabored. pt appears comfortable. awaiting MD to evaluate pt.

## 2019-09-04 NOTE — ED PROVIDER NOTE - ATTENDING CONTRIBUTION TO CARE
HPI: 35 yo M with no Past Medical History that presents with priapism. Pt has had history of this in past, first episode few years ago that was most likely due to testosterone and trazadone use but has had two episodes since and recently was here in ED requiring aspiration and drainage by URO. since has been well but two nights ago was having sexual intercourse with his GF and noticed that since his erection has not completely gone away but approx 70% flaccid. Pt denies pain, has been urinating normally, without hematuria. Has not had any trauma. Reports has been using seroquel which could be cause but has been using it for years trying to sleep.   EXAM: NAD, abd soft nontender,  shows flaccid penis, no engorgement.   MDM: unlikely priapism. Pt here for concern that his penis is not 100& flaccid but reportedly around 70% flaccid. Previous aspiration by URO approx 10 days ago. No signs infection, has not f/u with URO. No need for aspiration at this time. Pt urinating normally, no pain. Will discharge to f/u with URO in morning at [facility]. Return if worsening symptoms or erection.

## 2019-09-04 NOTE — ED PROVIDER NOTE - OBJECTIVE STATEMENT
35 yo M, w/ PMH of priapism, presenting from home w/ chief complaint of priapism. Patient had first episode of priapism in January 2018. At that time, he was taking amphetamines, supplements, steroids, and trazodone and after consultation with urologist, stopped taking these medications, and did not have further priapism issues. However, he had another episode of priapism on 08/25/19, which required drainage of blood from the corpus cavernosum. He was discharged home and recommended to make an appointment with urology, but never made appointment. He presents to [facility] ED this evening d/t priapism. States that he had sex with his girlfriend on the evening of 09/02/2019. He states that after having sex, his penis never quite returned to full flacid state. He states that he does not have a full erection, but that his penis is not fully flacid either. He denies any pain, discoloration, coldness, or other penile complaints. He has been urinating normally over this time period, but was concerned that this was happening again, so presented to ER. Denies any other complaints and has otherwise been in his normal state of health.     PMD: Dr. Lawson Benito  Pharmacy: [place], [place]

## 2019-09-05 ENCOUNTER — OTHER (OUTPATIENT)
Age: 34
End: 2019-09-05

## 2019-09-05 LAB — TESTOST SERPL-MCNC: 261 NG/DL

## 2019-09-11 ENCOUNTER — APPOINTMENT (OUTPATIENT)
Dept: UROLOGY | Facility: CLINIC | Age: 34
End: 2019-09-11
Payer: COMMERCIAL

## 2019-09-11 DIAGNOSIS — Z87.438 PERSONAL HISTORY OF OTHER DISEASES OF MALE GENITAL ORGANS: ICD-10-CM

## 2019-09-11 PROBLEM — N48.30 PRIAPISM, UNSPECIFIED: Chronic | Status: ACTIVE | Noted: 2019-09-04

## 2019-09-11 PROCEDURE — 93981 PENILE VASCULAR STUDY: CPT

## 2019-09-11 PROCEDURE — 99213 OFFICE O/P EST LOW 20 MIN: CPT | Mod: 25

## 2019-09-11 NOTE — PHYSICAL EXAM
[Urethral Meatus] : meatus normal [Penis Abnormality] : normal circumcised penis [FreeTextEntry1] : 11 cm; soft; no induration

## 2019-09-11 NOTE — ASSESSMENT
[FreeTextEntry1] : Patient has stopped seroquel\par Did not take sudafed as directed\par Do not start seroquel\par Reassurance\par DUS demonstrated normal flow with ? mild edema corporal body on left \par \par DIscussed hypogonadism\par Not consistent with history, libido etc\par Will repeat \par \par followup in 6 weeks\par

## 2019-10-03 ENCOUNTER — APPOINTMENT (OUTPATIENT)
Dept: UROLOGY | Facility: CLINIC | Age: 34
End: 2019-10-03
Payer: COMMERCIAL

## 2019-10-03 DIAGNOSIS — N48.89 OTHER SPECIFIED DISORDERS OF PENIS: ICD-10-CM

## 2019-10-03 PROCEDURE — 99213 OFFICE O/P EST LOW 20 MIN: CPT

## 2019-10-03 NOTE — HISTORY OF PRESENT ILLNESS
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

## 2019-10-03 NOTE — ASSESSMENT
[FreeTextEntry1] : Patient with history of priapism related to seroquel\par DUS demonstrated normal flow with ? mild edema corporal body on left \par Patient points to area on the left base with slight nodularity\par Discussed Peyronies disease; pathology; natural history and treatment options.\par Patient has symptomatic new lesion without curvature.\par Discussed oral medication and lack of proven efficacy.\par Discussed utilization of traction device\par \par \par \par \par DIscussed hypogonadism\par Not consistent with history, libido etc\par Patient did not repeat\par \par patient is extremely anxious about sexual function\par discussed need for intervention of anxiety\par DIscussed penile traction device (restorex)

## 2019-10-03 NOTE — PHYSICAL EXAM
[Urethral Meatus] : meatus normal [Penis Abnormality] : normal circumcised penis [FreeTextEntry1] : 13 cm; soft; ? induration at base

## 2019-10-07 ENCOUNTER — OUTPATIENT (OUTPATIENT)
Dept: OUTPATIENT SERVICES | Facility: HOSPITAL | Age: 34
LOS: 1 days | Discharge: STILL ADMITTED OR ADMINISTRATIVE | End: 2019-10-07

## 2019-10-08 DIAGNOSIS — F34.1 DYSTHYMIC DISORDER: ICD-10-CM

## 2019-10-08 DIAGNOSIS — M54.9 DORSALGIA, UNSPECIFIED: ICD-10-CM

## 2019-10-08 DIAGNOSIS — F32.9 MAJOR DEPRESSIVE DISORDER, SINGLE EPISODE, UNSPECIFIED: ICD-10-CM

## 2019-10-08 LAB
LH SERPL-ACNC: 4.3 IU/L
PROLACTIN SERPL-MCNC: 18.9 NG/ML

## 2019-10-11 LAB
TESTOST BND SERPL-MCNC: 11.3 PG/ML
TESTOST SERPL-MCNC: 539.7 NG/DL

## 2019-10-14 ENCOUNTER — MESSAGE (OUTPATIENT)
Age: 34
End: 2019-10-14

## 2019-10-17 ENCOUNTER — TRANSCRIPTION ENCOUNTER (OUTPATIENT)
Age: 34
End: 2019-10-17

## 2019-10-23 ENCOUNTER — APPOINTMENT (OUTPATIENT)
Dept: UROLOGY | Facility: CLINIC | Age: 34
End: 2019-10-23

## 2019-10-29 ENCOUNTER — RX RENEWAL (OUTPATIENT)
Age: 34
End: 2019-10-29

## 2019-11-06 ENCOUNTER — APPOINTMENT (OUTPATIENT)
Dept: UROLOGY | Facility: CLINIC | Age: 34
End: 2019-11-06

## 2019-11-07 ENCOUNTER — APPOINTMENT (OUTPATIENT)
Dept: PSYCHIATRY | Facility: CLINIC | Age: 34
End: 2019-11-07
Payer: COMMERCIAL

## 2019-11-07 PROCEDURE — 99215 OFFICE O/P EST HI 40 MIN: CPT

## 2019-11-07 RX ORDER — QUETIAPINE 100 MG/1
100 TABLET, FILM COATED ORAL
Refills: 0 | Status: DISCONTINUED | COMMUNITY
End: 2019-11-07

## 2019-12-04 ENCOUNTER — APPOINTMENT (OUTPATIENT)
Dept: PSYCHIATRY | Facility: CLINIC | Age: 34
End: 2019-12-04

## 2019-12-13 ENCOUNTER — APPOINTMENT (OUTPATIENT)
Dept: PSYCHIATRY | Facility: CLINIC | Age: 34
End: 2019-12-13
Payer: COMMERCIAL

## 2019-12-13 ENCOUNTER — APPOINTMENT (OUTPATIENT)
Dept: PSYCHIATRY | Facility: CLINIC | Age: 34
End: 2019-12-13

## 2019-12-13 PROCEDURE — 99214 OFFICE O/P EST MOD 30 MIN: CPT

## 2019-12-13 RX ORDER — GABAPENTIN 600 MG/1
600 TABLET, COATED ORAL
Qty: 60 | Refills: 0 | Status: DISCONTINUED | COMMUNITY
Start: 2019-11-07 | End: 2019-12-13

## 2019-12-13 RX ORDER — CITALOPRAM HYDROBROMIDE 20 MG/1
20 TABLET, FILM COATED ORAL
Qty: 30 | Refills: 0 | Status: DISCONTINUED | COMMUNITY
Start: 2019-11-07 | End: 2019-12-13

## 2020-01-08 ENCOUNTER — APPOINTMENT (OUTPATIENT)
Dept: PSYCHIATRY | Facility: CLINIC | Age: 35
End: 2020-01-08
Payer: SELF-PAY

## 2020-01-08 PROCEDURE — NSD00D NO SHOW FEE: CUSTOM

## 2020-01-10 ENCOUNTER — APPOINTMENT (OUTPATIENT)
Dept: PSYCHIATRY | Facility: CLINIC | Age: 35
End: 2020-01-10
Payer: COMMERCIAL

## 2020-01-10 PROCEDURE — 99214 OFFICE O/P EST MOD 30 MIN: CPT

## 2020-01-16 ENCOUNTER — OUTPATIENT (OUTPATIENT)
Dept: OUTPATIENT SERVICES | Facility: HOSPITAL | Age: 35
LOS: 1 days | End: 2020-01-16

## 2020-01-16 VITALS
DIASTOLIC BLOOD PRESSURE: 70 MMHG | WEIGHT: 166.89 LBS | SYSTOLIC BLOOD PRESSURE: 110 MMHG | HEIGHT: 66 IN | OXYGEN SATURATION: 99 % | TEMPERATURE: 98 F | HEART RATE: 66 BPM | RESPIRATION RATE: 15 BRPM

## 2020-01-16 DIAGNOSIS — M67.921 UNSPECIFIED DISORDER OF SYNOVIUM AND TENDON, RIGHT UPPER ARM: ICD-10-CM

## 2020-01-16 DIAGNOSIS — J10.1 INFLUENZA DUE TO OTHER IDENTIFIED INFLUENZA VIRUS WITH OTHER RESPIRATORY MANIFESTATIONS: ICD-10-CM

## 2020-01-16 DIAGNOSIS — S59.909A UNSPECIFIED INJURY OF UNSPECIFIED ELBOW, INITIAL ENCOUNTER: ICD-10-CM

## 2020-01-16 LAB
BASOPHILS # BLD AUTO: 0.03 K/UL — SIGNIFICANT CHANGE UP (ref 0–0.2)
BASOPHILS NFR BLD AUTO: 0.4 % — SIGNIFICANT CHANGE UP (ref 0–2)
EOSINOPHIL # BLD AUTO: 0.06 K/UL — SIGNIFICANT CHANGE UP (ref 0–0.5)
EOSINOPHIL NFR BLD AUTO: 0.9 % — SIGNIFICANT CHANGE UP (ref 0–6)
HCT VFR BLD CALC: 47 % — SIGNIFICANT CHANGE UP (ref 39–50)
HGB BLD-MCNC: 15.8 G/DL — SIGNIFICANT CHANGE UP (ref 13–17)
IMM GRANULOCYTES NFR BLD AUTO: 0.3 % — SIGNIFICANT CHANGE UP (ref 0–1.5)
LYMPHOCYTES # BLD AUTO: 2.18 K/UL — SIGNIFICANT CHANGE UP (ref 1–3.3)
LYMPHOCYTES # BLD AUTO: 31.8 % — SIGNIFICANT CHANGE UP (ref 13–44)
MCHC RBC-ENTMCNC: 27.4 PG — SIGNIFICANT CHANGE UP (ref 27–34)
MCHC RBC-ENTMCNC: 33.6 % — SIGNIFICANT CHANGE UP (ref 32–36)
MCV RBC AUTO: 81.5 FL — SIGNIFICANT CHANGE UP (ref 80–100)
MONOCYTES # BLD AUTO: 0.45 K/UL — SIGNIFICANT CHANGE UP (ref 0–0.9)
MONOCYTES NFR BLD AUTO: 6.6 % — SIGNIFICANT CHANGE UP (ref 2–14)
NEUTROPHILS # BLD AUTO: 4.11 K/UL — SIGNIFICANT CHANGE UP (ref 1.8–7.4)
NEUTROPHILS NFR BLD AUTO: 60 % — SIGNIFICANT CHANGE UP (ref 43–77)
NRBC # FLD: 0 K/UL — SIGNIFICANT CHANGE UP (ref 0–0)
PLATELET # BLD AUTO: 244 K/UL — SIGNIFICANT CHANGE UP (ref 150–400)
PMV BLD: 11 FL — SIGNIFICANT CHANGE UP (ref 7–13)
RBC # BLD: 5.77 M/UL — SIGNIFICANT CHANGE UP (ref 4.2–5.8)
RBC # FLD: 12.4 % — SIGNIFICANT CHANGE UP (ref 10.3–14.5)
WBC # BLD: 6.85 K/UL — SIGNIFICANT CHANGE UP (ref 3.8–10.5)
WBC # FLD AUTO: 6.85 K/UL — SIGNIFICANT CHANGE UP (ref 3.8–10.5)

## 2020-01-16 NOTE — H&P PST ADULT - GENERAL COMMENTS
pt reports he was dx with influenza A on 1/14/20.  TMAX 102 F. Pt reports he took Tamiflu x2 days. Pt reports he was last febrile 1/14/20 night time..  Pt reports cough improving, still expectorates small amount brownish mucous.  nasal congestion also improving. Pt reports he was dx with influenza A on 1/14/20 at  [facility].   TMAX 102 F.  Pt reports he took Tamiflu x2, last dose 1/15/20 days. Per pt, he was last febrile 1/14/20 night time..  Pt reports cough improving, still expectorates small amount brownish mucous.  Nasal congestion also improving.

## 2020-01-16 NOTE — H&P PST ADULT - HISTORY OF PRESENT ILLNESS
34 y/o male with hx of right elbow injury 2018 when he slipped and fell with tear to tendon. Pt reports he has had elbow pain since that time.  Scheduled for right elbow medial epicondular release with medial flexor tendon an ulnar nerve decompression, possible ulnar nerve transposition  1/22/20. 36 y/o male with hx of right elbow injury 2018 when he slipped and fell with tear to tendon. Pt reports he has had elbow pain since that time.  Scheduled for right elbow medial epicondular release with medial flexor tendon repair and ulnar nerve decompression, possible ulnar nerve transposition  1/22/20.

## 2020-01-16 NOTE — H&P PST ADULT - NSICDXPROBLEM_GEN_ALL_CORE_FT
PROBLEM DIAGNOSES  Problem: Elbow injury  Assessment and Plan: Right elbow medial epicondylar  release with medial flexor tendon an ulnar nerve decompression, possible ulnar nerve transposition  1/22/20.  CBC  Preop instructions and antibacterial soap given and explained (verbal and written), with teach back.      Problem: Influenza A  Assessment and Plan: Dx with Indfluenza 1/14/20. Pt advised to see PMD for pre-op eval., requested on chart PROBLEM DIAGNOSES  Problem: Elbow injury  Assessment and Plan: Right elbow medial epicondylar release with medial flexor tendon repair and ulnar nerve decompression, possible ulnar nerve transposition  1/22/20.   CBC  Preop instructions and antibacterial soap given and explained (verbal and written), with teach back.      Problem: Influenza A  Assessment and Plan: Dx with Indfluenza 1/14/20. Pt advised to see PMD for pre-op eval., requested on chart.

## 2020-01-16 NOTE — H&P PST ADULT - MUSCULOSKELETAL COMMENTS
right elbow injury 2018 when he slipped and fell with tear to tendon. Pt reports he has had elbow pain since that time.  Scheduled for right elbow medial epicondular release with medial flexor tendon an ulnar nerve decompression, possible ulnar nerve transposition  1/22/20. right elbow pain with movement right elbow injury 2018 when he slipped and fell with tear to tendon. Pt reports he has had elbow pain since that time.

## 2020-01-16 NOTE — H&P PST ADULT - NSICDXPASTMEDICALHX_GEN_ALL_CORE_FT
PAST MEDICAL HISTORY:  OCD (obsessive compulsive disorder)     Priapism PAST MEDICAL HISTORY:  Anxiety     Elbow injury (R)    OCD (obsessive compulsive disorder)     Priapism

## 2020-01-16 NOTE — H&P PST ADULT - NSANTHOSAYNRD_GEN_A_CORE
No. KIRSTIE screening performed.  STOP BANG Legend: 0-2 = LOW Risk; 3-4 = INTERMEDIATE Risk; 5-8 = HIGH Risk

## 2020-01-21 ENCOUNTER — TRANSCRIPTION ENCOUNTER (OUTPATIENT)
Age: 35
End: 2020-01-21

## 2020-01-22 ENCOUNTER — OUTPATIENT (OUTPATIENT)
Dept: OUTPATIENT SERVICES | Facility: HOSPITAL | Age: 35
LOS: 1 days | Discharge: HOME | End: 2020-01-22

## 2020-01-22 VITALS
HEIGHT: 66 IN | RESPIRATION RATE: 28 BRPM | SYSTOLIC BLOOD PRESSURE: 124 MMHG | HEART RATE: 58 BPM | WEIGHT: 166.89 LBS | DIASTOLIC BLOOD PRESSURE: 61 MMHG | OXYGEN SATURATION: 99 % | TEMPERATURE: 98 F

## 2020-01-22 VITALS
DIASTOLIC BLOOD PRESSURE: 67 MMHG | OXYGEN SATURATION: 98 % | RESPIRATION RATE: 14 BRPM | HEART RATE: 70 BPM | TEMPERATURE: 98 F | SYSTOLIC BLOOD PRESSURE: 116 MMHG

## 2020-01-22 DIAGNOSIS — M67.921 UNSPECIFIED DISORDER OF SYNOVIUM AND TENDON, RIGHT UPPER ARM: ICD-10-CM

## 2020-01-22 RX ORDER — CLONAZEPAM 1 MG
0 TABLET ORAL
Qty: 0 | Refills: 0 | DISCHARGE

## 2020-01-22 RX ORDER — SODIUM CHLORIDE 9 MG/ML
1000 INJECTION, SOLUTION INTRAVENOUS
Refills: 0 | Status: DISCONTINUED | OUTPATIENT
Start: 2020-01-22 | End: 2020-02-06

## 2020-01-22 RX ORDER — IBUPROFEN 200 MG
1 TABLET ORAL
Qty: 0 | Refills: 0 | DISCHARGE

## 2020-01-22 NOTE — ASU DISCHARGE PLAN (ADULT/PEDIATRIC) - MEDICATION INSTRUCTIONS
motrin 600mg every 6 hours for 1 week. motrin 600mg every 6 hours for 1 week. Percocet 1 tabs every 4 hours as needed for severe pain.

## 2020-01-22 NOTE — ASU DISCHARGE PLAN (ADULT/PEDIATRIC) - CALL YOUR DOCTOR IF YOU HAVE ANY OF THE FOLLOWING:
Numbness, tingling, color or temperature change to extremity/Fever greater than (need to indicate Fahrenheit or Celsius) Wound/Surgical Site with redness, or foul smelling discharge or pus/Numbness, tingling, color or temperature change to extremity/Inability to tolerate liquids or foods/Pain not relieved by Medications/Nausea and vomiting that does not stop/Unable to urinate/Bleeding that does not stop/Swelling that gets worse/Fever greater than (need to indicate Fahrenheit or Celsius)

## 2020-01-22 NOTE — ASU DISCHARGE PLAN (ADULT/PEDIATRIC) - ASU DC SPECIAL INSTRUCTIONSFT
keep splint on and dry.  Keep arm in sling. keep splint on and dry.  Keep arm in sling. call for follow up appointment and see surgeon on following monday.

## 2020-01-22 NOTE — ASU DISCHARGE PLAN (ADULT/PEDIATRIC) - CARE PROVIDER_API CALL
Wolfgang Paredes)  Orthopaedic Surgery  [address], [address]  [place] [address]  Phone: [phone]  Fax: [phone]  Follow Up Time:

## 2020-01-22 NOTE — ASU DISCHARGE PLAN (ADULT/PEDIATRIC) - PAIN MANAGEMENT
Take over the counter pain medication Take over the counter pain medication/Prescriptions electronically submitted to pharmacy from [facility]

## 2020-01-27 ENCOUNTER — APPOINTMENT (OUTPATIENT)
Dept: PSYCHIATRY | Facility: CLINIC | Age: 35
End: 2020-01-27
Payer: SELF-PAY

## 2020-01-27 PROCEDURE — NSD00D NO SHOW FEE: CUSTOM

## 2020-01-28 ENCOUNTER — APPOINTMENT (OUTPATIENT)
Dept: PSYCHIATRY | Facility: CLINIC | Age: 35
End: 2020-01-28
Payer: COMMERCIAL

## 2020-01-28 PROBLEM — S59.909A UNSPECIFIED INJURY OF UNSPECIFIED ELBOW, INITIAL ENCOUNTER: Chronic | Status: ACTIVE | Noted: 2020-01-16

## 2020-01-28 PROBLEM — F41.9 ANXIETY DISORDER, UNSPECIFIED: Chronic | Status: ACTIVE | Noted: 2020-01-16

## 2020-01-28 PROCEDURE — 99214 OFFICE O/P EST MOD 30 MIN: CPT

## 2020-01-29 ENCOUNTER — APPOINTMENT (OUTPATIENT)
Dept: PSYCHIATRY | Facility: CLINIC | Age: 35
End: 2020-01-29
Payer: COMMERCIAL

## 2020-01-29 PROCEDURE — 90791 PSYCH DIAGNOSTIC EVALUATION: CPT

## 2020-02-06 ENCOUNTER — APPOINTMENT (OUTPATIENT)
Dept: PSYCHIATRY | Facility: CLINIC | Age: 35
End: 2020-02-06
Payer: SELF-PAY

## 2020-02-06 PROCEDURE — NSD00D NO SHOW FEE: CUSTOM

## 2020-02-12 ENCOUNTER — APPOINTMENT (OUTPATIENT)
Dept: PSYCHIATRY | Facility: CLINIC | Age: 35
End: 2020-02-12
Payer: SELF-PAY

## 2020-02-12 PROCEDURE — NSD00D NO SHOW FEE: CUSTOM

## 2020-04-15 ENCOUNTER — APPOINTMENT (OUTPATIENT)
Dept: PSYCHIATRY | Facility: CLINIC | Age: 35
End: 2020-04-15
Payer: SELF-PAY

## 2020-04-15 PROCEDURE — 99442: CPT

## 2020-07-27 ENCOUNTER — APPOINTMENT (OUTPATIENT)
Dept: PSYCHIATRY | Facility: CLINIC | Age: 35
End: 2020-07-27
Payer: COMMERCIAL

## 2020-07-27 PROCEDURE — 99214 OFFICE O/P EST MOD 30 MIN: CPT

## 2020-08-24 ENCOUNTER — APPOINTMENT (OUTPATIENT)
Dept: PSYCHIATRY | Facility: CLINIC | Age: 35
End: 2020-08-24
Payer: COMMERCIAL

## 2020-08-24 PROCEDURE — 99214 OFFICE O/P EST MOD 30 MIN: CPT

## 2020-09-02 NOTE — H&P PST ADULT - ASSESSMENT
Never 36 y/o male with hx of right elbow injury 2018 when he slipped and fell with tear to tendon. Pt reports he has had elbow pain since that time.  Scheduled for right elbow medial epicondylar  release with medial flexor tendon an ulnar nerve decompression, possible ulnar nerve transposition  1/22/20. 34 y/o male with hx of right elbow injury 2018 when he slipped and fell with tear to tendon. Pt reports he has had elbow pain since that time.  Scheduled for right elbow medial epicondylar release with medial flexor tendon repair and ulnar nerve decompression, possible ulnar nerve transposition  1/22/20.

## 2020-09-08 NOTE — HISTORY OF PRESENT ILLNESS
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
Calm

## 2020-11-11 ENCOUNTER — APPOINTMENT (OUTPATIENT)
Dept: PSYCHIATRY | Facility: CLINIC | Age: 35
End: 2020-11-11
Payer: SELF-PAY

## 2020-11-11 PROCEDURE — NSD00D NO SHOW FEE: CUSTOM

## 2020-11-20 ENCOUNTER — APPOINTMENT (OUTPATIENT)
Dept: PSYCHIATRY | Facility: CLINIC | Age: 35
End: 2020-11-20
Payer: SELF-PAY

## 2020-11-20 PROCEDURE — 99214 OFFICE O/P EST MOD 30 MIN: CPT

## 2020-11-20 RX ORDER — ESCITALOPRAM OXALATE 10 MG/1
10 TABLET ORAL
Qty: 90 | Refills: 0 | Status: DISCONTINUED | COMMUNITY
Start: 2019-12-13 | End: 2020-11-20

## 2020-12-18 ENCOUNTER — APPOINTMENT (OUTPATIENT)
Dept: PSYCHIATRY | Facility: CLINIC | Age: 35
End: 2020-12-18
Payer: COMMERCIAL

## 2020-12-18 PROCEDURE — 99214 OFFICE O/P EST MOD 30 MIN: CPT

## 2020-12-18 PROCEDURE — 99072 ADDL SUPL MATRL&STAF TM PHE: CPT

## 2021-01-12 NOTE — ED ADULT NURSE NOTE - NSHOSCREENINGQ1_ED_ALL_ED
Liz Pitt presents today for follow up.     Referring provider:   Zhao Flor MD     Chief Complaint:  ***    Problem List:  S/P TAVR, 10/3/2019  Recent lower GI bleed, anemia, thrombocytopenia 3/2020  Coronary artery bypass graft surgery March 2010   Cardiac catheterization 7/3/2019, SVG to Diag occluded  Pulmonary hypertension group 2   Small pericardial effusion, 11/5/2019  Chronic diastolic congestive heart failure   Paroxysmal atrial fibrillation, status post maze procedure March 2010  Peripheral arterial disease lower extremities, Status Post  Left SFA stent 9/2012   Status post left carotid endarterectomy March 4 of 2010   Occluded left renal artery, Chronic kidney disease  Hyperlipidemia   Hypertension   Hyperparathyroidism   Renal insuffiencey   Glaucoma, macular degeneration    Significant Cardiac History:  Pleasant 88 year old female with history of coronary artery disease and valvular heart disease.  She had coronary artery bypass surgery ×2 on March 6, 2010 by Dr. Roy at [facility].  Grafts are LIMA to LAD, saphenous vein graft to diagonal.     On 03/23/2020 - 03/28/2020 patient admitted to [facility] for melena and symptomatic anemia.  Patient instructed to hold both Plavix and Warfarin for at least 1-2 weeks and use only one of them after is strongly needed.  Patient planning to see Dr. Stephen.    She is also treated for atrial fibrillation and had a modified MAZE procedure March 6 of 2010.  She is treated for peripheral arterial disease and is status post left carotid endarterectomy on March 4 of 2010; and lower extremity PAD.  Other cardiology problems include hyperlipidemia and hypertension .    On 08/17/2018 [facility] Cardiac Imaging, showed, no myocardial uptake of technetium pyrophosphate in the  left or right ventricle to suggest TTR-amyloid heart disease.    Patient saw Dr. Lozada from Nephrology on 03/16/2020 for follow up of CKD: Stage 3-4:  Secondary to  her long-standing hypertension tobacco use and renal artery stenosis. She has an occluded left renal artery and the kidney is atrophic. Her right kidney has hypertrophied and IS 13 cm and she has APPROX 40% stenosis to this kidney.     A cardiac catheterization 7/3/2019 with findings of;  Prox LAD lesion with 100% stenosis.Origin to Prox Graft lesion with 100% stenosis.  CAD s/p CABG (LIMA to LAD, SVG to Diag occluded) Moderate Aortic Stenosis    A Transcatheter aortic valve replacement 10/3/2019, Placed a #29 Evolut Pro, with Dr. Magana and Dr. Amezquita.    Last saw Zhao Flor MD, 01/07/2021, chronic kidney disease, chronic pain, fatigue, leg edema and recent aortic valve replacement.  Patient is now off of  Warfarin due to multiple GI bleeds requiring hospital admission.    10/22/2020 echocardiogram with results of; Status post transcatheter aortic valve replacement (TAVR) 29 mm Evolut Pro well seated .Left ventricular ejection fraction, 52 %.Mildly decreased left ventricular ejection fraction. Abnormal (paradoxical) septal motion consistent with postoperative status. Moderately decreased right ventricular systolic function. Severe pulmonary hypertension, RVSP 72.9 mmHg. Severely increased left atrial volume 66 ml/m². Moderate mitral valve regurgitation. Moderate to severe tricuspid valve regurgitation. No pericardial effusion.  No significant change since the prior study of 11/05/2019.    History of Present Illness:  ***    Past Medical History:   Diagnosis Date   • Allergy     see list   • Anatomical narrow angle borderline glaucoma 11/10/2012   • Aortic valve stenosis, moderate    • Atrial fibrillation (CMS/HCC) 1/19/2010   • Benign neoplasm of colon 12/98   • Chronic pain     back   • Coronary atherosclerosis of autologous vein bypass graft 03/01/2010    CABG x 2   • CRF (chronic renal failure)     stage 2   • Cystitis cystica 11/92    bladder bx   • Diffuse cystic mastopathy 10/99   • Diverticulitis of  colon (without mention of hemorrhage)(562.11) 1/99    Diverticulitis, chronic   • Duodenal ulcer due to Helicobacter pylori    • Glaucoma    • Gout    • History of femoral angiogram 7/17/2018 09/18/2012 lower extremities angiogram  PROCEDURES PERFORMED:  1. Aortobifemoral angiogram with distal runoff.  2. Third order angiography of the left lower extremity.  3. Selective renal artery angiograms.  4. Successful angioplasty followed by stenting of the left superficial femoral artery.  FINAL IMPRESSION:  1. Totally occluded left renal artery.  2. There is 40% lesion in the right renal artery.  3. There is 60 to 70% tandem lesion in the right superficial femoral artery.  4. Totally occluded left superficial femoral artery.  5. Successful intervention of the left superficial femoral artery with angioplasty and stenting.      • Hyperlipidemia, unspecified 11/4/2004   • Hyperparathyroidism (CMS/HCC)    • Hyperparathyroidism due to renal insufficiency (CMS/HCC) 6/12/2014   • Kidney disease, chronic, stage IV (GFR 15-29 ml/min) (CMS/HCC) 1/12/2013   • Macular degeneration of both eyes 10/28/2013   • Nonspecific (abnormal) findings on radiological and other examination of gastrointestinal tract 2/6/13    POPPY/[place]   • Obstruction of bile duct 3/6/13    ERCP Dr. Interiano   • Occlusion and stenosis of carotid artery without mention of cerebral infarction 3/1/2010    s/p Left CEA   • Osteoarthrosis, unspecified whether generalized or localized, lower leg 11/98    knees   • Other and unspecified hyperlipidemia 10/99   • PAD (peripheral artery disease) (CMS/[facility]) 9/18/2012    Left renal artery occluded; right renal artery 40% stenosis; left SFA occluded and s/p stent placement 9/18/2012 with restoration of flow; right SFA with 2 tandem 60-70% lesions   • Pyelonephritis, unspecified 5/99   • Rheumatic fever without mention of heart involvement 1955    Rheumatic Fever   • Strangulated ventral hernia    • Unspecified essential  hypertension 11/98   • Urinary incontinence     wear pad   • Wears dentures     upper and lower plates   • Wears glasses      Past Surgical History:   Procedure Laterality Date   • Aorta bifemoral angiogram/possible pta/possible stent - cv  07/03/2019   • Appendectomy,w othr proc  8/99    Dr. Rae   • Bd dexa axial skeleton  9/18/14    osteopenia   • Biliary endoscopy percut,remv stone  2/27/2013    Dr. Rae   • Biopsy of breast, incisional  1986    Benign   • Cabg, artery-vein, two  03/06/2010    Dr. Roy @ [facility]--ATKINS->LAD; RSVG->D1   • Cataract extraction w/ intraocular lens implant     • Colonoscopy  05/12/2017   • Colonoscopy ablate lesion  4/5/05    Hyperplastic polpy, Dr Rae colonoscopy with filguration of rectal polyps hot biospy,polypectomies   • Colonoscopy diagnostic  6/15/2010    Dr Rae   • Destruction by neurolytic agt lumbar or sacral facet jt  5/22/12    LRFA 1 WEEK    • Destruction by neurolytic agt lumbar or sacral facet jt  6/12/12    OV 3 MONTHS   • Ercp  2/6/13    Dr. Interiano   • Ercp,insert stent,biliary/panc  3/6/13    Dr. Interiano    • Ercp,sphincterotomy  3/6/13    Dr. Interiano w/sphincterotomy/   • Esophagogastroduodenoscopy transoral flex diag  6/28/2010    +h. pylori, Dr Rae   • Esophagogastroduodenoscopy transoral flex diag  11/2/2010    Dr Rae   • Esophagogastroduodenoscopy transoral flex diag  05/11/2017   • Femoral artery repair Right 07/04/2019    with evacuation of hematoma   • Femoral artery stent  9/18/2012    Dr. Bourgeois: 6 mm x 150 mm Medtronic Complete stent in the distal superficial femoral artery and 6 mm x 120 mm Medtronic Complete stent proximal to ostial SFA   • Groin exploration Right 07/18/2019    with wound vac   • Left heart cath,percutaneous  02/23/2010    Dr. Stanford @ [facility]--est EF 55%; significant 1-vessel CAD (LAD); complex LAD lesion w/mild->mod disease in the remainder of the coronaries   • Paravertebral facet inj jnt lumbar sacral 1  5/1/12    2ND RIGHT  LMBB 2 WEEKS   • Part removal colon w anastomosis  8/99    ischemic colitis, colostomy closure w resection of distal sigmoid colon, parastomal herniorraphyDr. Butch   • Part removal colon w end colostomy  1/99    Eliud procedure for tight partial large bowel obstruction, chronic sigmoid diverticulitis.  Dr. Rae   • Remv cataract extracap insert lens Right 03/20/2015    SN60WF 27.0 tph   • Remv cataract extracap insert lens Left 04/14/15    SN60WF 26.5 Dr. prateek Bridges   • Repair recurr incis hernia,edgar  2/27/2013    Dr. Rae   • Tavr iliofemoral-cv  10/03/2019   • Thromboendart w/w0 graft carotid  neck incs  03/04/2010    Left, Dr Rae   • Total abdom hysterectomy  8/99    EARNEST w BSO, endometriosis, salpingitis.  Dr. Rae     ALLERGIES:  Amlodipine and Morphine    Current Outpatient Medications   Medication Sig Dispense Refill   • allopurinol (ZYLOPRIM) 100 MG tablet Take 2 tablets by mouth daily. 180 tablet 3   • pravastatin (PRAVACHOL) 40 MG tablet Take 1 tablet by mouth nightly. 90 tablet 3   • HYDROcodone-acetaminophen (NORCO)  MG per tablet Take 1 tablet by mouth every 8 hours as needed for Pain (arthritis pain). 60 tablet 0   • carvedilol (COREG) 12.5 MG tablet TAKE ONE TABLET BY MOUTH TWICE A DAY WITH MEALS 180 tablet 3   • ferrous sulfate 325 (65 FE) MG tablet 1 tab po every 2 weeks 30 tablet 0   • furosemide (LASIX) 20 MG tablet Take 1 tablet by mouth daily. 90 tablet 3   • losartan (COZAAR) 50 MG tablet Take 1 tablet by mouth 2 times daily. patient changed 180 tablet 3   • clopidogrel (PLAVIX) 75 MG tablet Take 1 tablet by mouth daily. 90 tablet 3   • calcitRIOL (ROCALTROL) 0.25 MCG capsule Take 1 capsule by mouth daily. 90 capsule 3     No current facility-administered medications for this visit.        Review of Systems:  GENERAL: Negative for: {ROS - GENERAL:876792::\"fever\",\"chills\"}  PSYCH: Negative for: {ROS - PSYCH:428955::\"anxiety\",\"depression\",\"memory problems \"}  NEURO:  Negative for: {ROS - NEURO:237455::\"lightheadedness\",\"dizziness \"}  PULMONARY: Negative for: {ROS PULM:292586::\"shortness of breath\",\"wheezing\",\"cough\",\"sputum production\",\"hemoptysis \"}  CV: Negative for: {ROS - CV:371026::\"syncope\",\"angina\",\"palpitations\",\"claudication\"}  GI: Negative for: {ROS - GI:335085::\"hematemesis \",\"melena\"}  MS: Negative for: {ROS - MUSCULOSKELETAL:190000::\"muscle weakness\",\"joint stiffness\",\"swelling \",\"myalgias\"}      Physical Exam:   There were no vitals taken for this visit.  General: {GENERAL:951089::\"resting comfortably\",\"in no acute distress\"}  HEENT:   {PE HEENT:863173::\"normocephalic\",\"atraumatic\",\"adequate oral hygiene\",\"no pallor, no jaundice\"}  Neck:  {PE NECK:552527::\"supple\",\"no carotid bruits\",\"no thyromegaly\",\"no JVD\",\"no cervical lymph nodes\"}  Lungs:  {PE LUNGS:560671::\"clear to auscultation\",\"good air entry\",\"no rales or wheezes\",\"no rhonchi\"}  Cardiovascular:   {PE CARDIO:584750::\"PMI nondisplaced\",\"regular rate and rhythm\",\"no JVD, S1 and S2 normal\",\"no S3 or S4\"}  Abdomen:   {PE ABD:654504::\"soft, non-tender, nondistended\",\"no hepatosplenomegaly\",\"normal active bowel sounds\",\"no masses palpated\"}  Extremities:   {EXTREMITIES NEGATIVES LIST:886326::\"no erythema, induration\",\"no edema\",\"peripheral pulses palpable and symmetric\"}  Neuro:  {PE NEURO:518014::\"awake, alert, oriented X3\"}  Skin:   {SKIN EXAM:802017::\"color, texture, turgor are normal\",\"no bruises, rashes or lesions\"}       Lab Results   Component Value Date    SODIUM 137 01/04/2021    POTASSIUM 4.6 01/04/2021    BUN 76 (H) 01/04/2021    CREATININE 1.69 (H) 01/04/2021    WBC 4.7 01/04/2021    HCT 35.0 (L) 01/04/2021    HGB 11.0 (L) 01/04/2021     (L) 01/04/2021    INR 2.9 03/04/2020    CPK 20 (L) 12/09/2013    PTT 28 06/11/2012    GLUCOSE 101 (H) 01/04/2021    TSH 3.761 01/04/2021     (H) 06/13/2017    CHOLESTEROL 101 01/04/2021    HDL 48 (L) 01/04/2021    CALCLDL 41 01/04/2021    TRIGLYCERIDE  60 01/04/2021    MG 2.5 (H) 10/04/2019    GFRA 36 12/30/2019    GFRNA 31 12/30/2019    AST 33 01/04/2021    GPT 36 01/04/2021    ALKPT 145 (H) 01/04/2021    BILIRUBIN 1.1 (H) 01/04/2021     No results found for: NTPROB       Imaging:  10/22/2020 Echocardiogram  Status post transcatheter aortic valve replacement (TAVR) 29 mm Evolut Pro well seated.  Left ventricular ejection fraction, 52 %.  Mildly decreased left ventricular ejection fraction.  Abnormal (paradoxical) septal motion consistent with postoperative status.  Moderately decreased right ventricular systolic function.  Severe pulmonary hypertension, RVSP 72.9 mmHg.  Severely increased left atrial volume 66 ml/m².  Moderate mitral valve regurgitation.  Moderate to severe tricuspid valve regurgitation.  No pericardial effusion.  No significant change since the prior study of 11/05/2019.  03/22/2020 EKG from [facility]  Atrial fibrillation  Nonspecific intraventricular block  Cannot rule out Septal infarct , age undetermined  Possible Lateral infarct , age undetermined  Abnormal ECG  No previous ECGs available  Confirmed by JAKY CONROY MD  11/05/2019 Rhythm Strip  Atrial fibrillation with controlled ventricular response.    10/07/2019 Cardiac Event Monitor  30 day event monitor ordered for bradycardia (post TAVR)  %, average Ventricular rate 66 bpm  Fastest  bpm  IVCD noted, PVCs noted as well  7 manual transmissions - correlated to AF with Wide complex beats suggestive of PVCs vs. Aberrant conduction  Bradycardia 22%  No evidence of high grade AV block noted   10/06/2019 12-Lead EKG  Atrial fibrillation   Indeterminate axis   Nonspecific intraventricular block   Anterolateral infarct (cited on or before 03-OCT-2019)   When compared with ECG of 05-OCT-2019 04:12, QRS axis shifted right   T wave amplitude has increased in Lateral leads   Confirmed by GUILLE HILL, ELVIA ([id number]) on 10/6/2019 7:25:47AM    11/29/2018 US Carotid Duplex Bilateral  1.   50-69% stenosis within the right internal carotid artery.  2.  No evidence of a hemodynamically significant stenosis within the left internal carotid artery.  3.  Bilateral antegrade vertebral artery flow.  08/17/2018 NM Amyloid Cardiac Imaging  IMPRESSION:  No myocardial uptake of technetium pyrophosphate in the  left or right ventricle to suggest TTR-amyloid heart disease.  07/14/2015  Regadenoson Stress Test   1. Regadenoson stress ECG negative for ST segment changes or arrhythmias.   2. Abnormal myocardial perfusion imaging demonstrating a moderate sized,   mixed perfusion defect in the apex. This area may represent a prior, nontransmural myocardial infarction with cristhian-infarct ischemia versus an apical thinning artifact.   3. Normal gated study with a calculated ejection fraction of 58%     [id number] VASC  score  CHF (1point): 1  Hypertension (1 point): 1  Age >/= to 75 years (2 points): 2  Diabetes Mellitus (1 point): 0  Stroke/Thromboembolism previously (2 points): 0  Vascular disease (1 point): 1  Age 65-74 (1 point): 0  Sex category (female=1 point): 1  Total points: 6      Assessment:   ***    Plan:   ***Continue current cardiac medical therapy   Medications questions answered  Explained in detail findings and plan  This has been fully explained to the patient, who indicates understanding.  Follow up in cardiology clinic in *** or sooner if symptomatic       ***    ***      We would like to thank Dr. Zhao Flor MD for involving us in care and assessment of Liz Pitt.     No

## 2021-03-19 ENCOUNTER — APPOINTMENT (OUTPATIENT)
Dept: PSYCHIATRY | Facility: CLINIC | Age: 36
End: 2021-03-19
Payer: COMMERCIAL

## 2021-03-19 PROCEDURE — 99214 OFFICE O/P EST MOD 30 MIN: CPT

## 2021-03-19 PROCEDURE — 99072 ADDL SUPL MATRL&STAF TM PHE: CPT

## 2021-06-09 ENCOUNTER — APPOINTMENT (OUTPATIENT)
Dept: PSYCHIATRY | Facility: CLINIC | Age: 36
End: 2021-06-09
Payer: COMMERCIAL

## 2021-06-09 PROCEDURE — 99072 ADDL SUPL MATRL&STAF TM PHE: CPT

## 2021-06-09 PROCEDURE — 99214 OFFICE O/P EST MOD 30 MIN: CPT

## 2021-06-17 ENCOUNTER — EMERGENCY (EMERGENCY)
Facility: HOSPITAL | Age: 36
LOS: 1 days | Discharge: HOME | End: 2021-06-17
Attending: EMERGENCY MEDICINE
Payer: COMMERCIAL

## 2021-06-17 VITALS
SYSTOLIC BLOOD PRESSURE: 169 MMHG | TEMPERATURE: 99 F | DIASTOLIC BLOOD PRESSURE: 78 MMHG | HEIGHT: 66 IN | HEART RATE: 86 BPM | WEIGHT: 184.97 LBS | OXYGEN SATURATION: 98 % | RESPIRATION RATE: 18 BRPM

## 2021-06-17 VITALS
OXYGEN SATURATION: 98 % | DIASTOLIC BLOOD PRESSURE: 84 MMHG | SYSTOLIC BLOOD PRESSURE: 146 MMHG | RESPIRATION RATE: 16 BRPM | HEART RATE: 72 BPM

## 2021-06-17 PROCEDURE — 54220 IRRG CRPRA CAVRNOSA PRIAPISM: CPT

## 2021-06-17 PROCEDURE — 99284 EMERGENCY DEPT VISIT MOD MDM: CPT | Mod: 25

## 2021-06-17 PROCEDURE — 99283 EMERGENCY DEPT VISIT LOW MDM: CPT

## 2021-06-17 PROCEDURE — 99284 EMERGENCY DEPT VISIT MOD MDM: CPT

## 2021-06-17 RX ORDER — PSEUDOEPHEDRINE HCL 30 MG
120 TABLET ORAL ONCE
Refills: 0 | Status: COMPLETED | OUTPATIENT
Start: 2021-06-17 | End: 2021-06-17

## 2021-06-17 RX ORDER — OXYCODONE HYDROCHLORIDE 5 MG/1
1 TABLET ORAL
Qty: 6 | Refills: 0
Start: 2021-06-17 | End: 2021-06-18

## 2021-06-17 RX ADMIN — Medication 120 MILLIGRAM(S): at 03:15

## 2021-06-17 NOTE — PROCEDURE NOTE - NSTIMEOUT_GEN_A_CORE
yes Patient's first and last name, , procedure, and correct site confirmed prior to the start of procedure.

## 2021-06-17 NOTE — CONSULT NOTE ADULT - SUBJECTIVE AND OBJECTIVE BOX
HPI: 35 yo M with PMHx of depression on Luvox and Klonopin presents to ED with c/o of painful erection for roughly the past 14.5 hours. Pt reports he woke up around 12-1230PM on 6/16 with an erection. States it was unprovoked. Only started to become painful tonight. Pain comes and goes. Still able to void without difficulty. Urine clear yellow. No testicular pain, but did feel a tightness in the scrotum with penile pain, currently not experiencing. Has had 2 previous priapisms in the past requiring aspiration and phenylephrine for treatment. First time was secondary to Trazodone and testosterone supplementation, the second time could not be linked to any specific cause. Denies fever/chills, N/V, abd pain, urinary retention, dysuria, hematuria, or other acute complaints. No history of sickle cell disease or blood dyscrasias.     PAST MEDICAL & SURGICAL HISTORY:      MEDICATIONS  (STANDING):  pseudoephedrine 120 milliGRAM(s) Oral Once    MEDICATIONS  (PRN):      FAMILY HISTORY:      Allergies    No Known Allergies    Intolerances        SOCIAL HISTORY:    REVIEW OF SYSTEMS: Otherwise negative as stated in HPI    Physical Exam  Vital signs  T(C): 37.1 (06-17-21 @ 00:50), Max: 37.1 (06-17-21 @ 00:50)  HR: 80 (06-17-21 @ 01:48)  BP: 153/81 (06-17-21 @ 01:48)  SpO2: 99% (06-17-21 @ 01:48)    Output    Gen: No acute distress  Pulm: No respiratory distress  Abd: soft, nontender, nondistended. no rebound or guarding  : penis erect and rigid. glans hard. unable to bend shaft secondary to rigid penis. testes descended bilaterally and nontender.

## 2021-06-17 NOTE — ED PROVIDER NOTE - PATIENT PORTAL LINK FT
You can access the FollowMyHealth Patient Portal offered by [facility] by registering at the following website: [email or web address]. By joining [employer]’s FollowMyHealth portal, you will also be able to view your health information using other applications (apps) compatible with our system.

## 2021-06-17 NOTE — CHART NOTE - NSCHARTNOTEFT_GEN_A_CORE
Pt reassessed 45 minutes after aspiration and phenylephrine injection.  Pt reports feeling well. Denies further pain, lightheadedness, chest pain, shortness of breath, or other acute complaint.    BP - 150/83  HR- 57  O2 - 99% on RA    : circ penis. remains flaccid. Able to bend shaft without difficulty. Glans soft. Testes nontender.     Stable for discharge from a  standpoint. Follow up  consult note recommendations for further instructions.   Return to ED precautions provided to patient. Pt verbalized understanding.    Please page [phone] for any additional questions or concerns. Pt reassessed 45 minutes after aspiration and phenylephrine injection.  Pt reports feeling well. Denies further pain, lightheadedness, chest pain, shortness of breath, or other acute complaint.    BP - 150/83  HR- 57  O2 - 99% on RA    : circ penis. remains flaccid. Able to bend shaft without difficulty. Glans soft. Light pressure dressing remains intact and is C/D/I. Testes nontender and descended bilaterally.     Stable for discharge from a  standpoint. Follow up  consult note recommendations for further instructions.   Return to ED precautions provided to patient. Pt verbalized understanding.    Please page [phone] for any additional questions or concerns.

## 2021-06-17 NOTE — ED PROVIDER NOTE - NSFOLLOWUPINSTRUCTIONS_ED_ALL_ED_FT
you were seen in the emergency department today for priapism.   you were seen by urology and had an aspiration and injection of phenylephrine.   pressure dressing was applied. please keep this on for 48-72 hours.   you can take tylenol 500-1000 and or motrin 600mg every 6 hours for pain.   take prescribed pain medication as instructed for severe pain.   please follow up with Dr. Joe Dominguez, [facility] in the next week.   return to the emergency department for any new or worsening symptoms     [facility]  [address] [place] [address], [place] [address]  [phone]

## 2021-06-17 NOTE — ED PROVIDER NOTE - ATTENDING CONTRIBUTION TO CARE
Pt presented with priapism. evaluated and treated by urology, had I&D by urology, on re-eval, pt remained flaccid, pain controlled. pt safe for d/c with urology outpt f/u for further care. pt given return precautions.

## 2021-06-17 NOTE — ED PROVIDER NOTE - CLINICAL SUMMARY MEDICAL DECISION MAKING FREE TEXT BOX
37yo M presenting with complaints of priapism beginning this afternoon, started to become painful several hours before presentation. urinating okay. has had this in the past. penile erection without tenderness or skin changes on exam. will consult urology.

## 2021-06-17 NOTE — ED PROVIDER NOTE - OBJECTIVE STATEMENT
37yo M Hx of depression on Luvox and Klonipin presenting with complaints of priapism. woke this afternoon 11/12PM and had an erection which has not resolved. initially painless but has become sore over the past several hours. urinating without difficulty. no skin changes, no testicular pain. has not taken any medications other than his prescribed, no viagra. has had priapism in the past, several years ago because he was taking testosterone supplements and again in 2019 at which time he was on trazadone and has been off that medication since that time. denies any abd pain, n/v, cp, sob.

## 2021-06-17 NOTE — ED PROVIDER NOTE - PROGRESS NOTE DETAILS
giorgi villalta pgy2: pt had aspiration and phenylephrine injection. remains flacid over an hour later. pressure dressing applied which will stay on for 48-72 hours. will need to follow up at [facility] urology. tylenol and motrin for pain management, oxycodone for severe pain. will discharge home at that time.

## 2021-06-17 NOTE — ED ADULT TRIAGE NOTE - CHIEF COMPLAINT QUOTE
Priapism since this afternoon. Reports having 2 previous episodes of priapism in the past, was told it was caused by previous medication he was prescribed. Currently taking Luvox and Klonopin.

## 2021-06-17 NOTE — PROCEDURE NOTE - NSINFORMCONSENT_GEN_A_CORE
in chart/Benefits, risks, and possible complications of procedure explained to patient/caregiver who verbalized understanding and gave written consent.

## 2021-06-17 NOTE — PROCEDURE NOTE - ADDITIONAL PROCEDURE DETAILS
Injection of 15 cc of 100 mcg/mL phenylephrine  Appropriate reduction of priapism. Hemodynamically stable    -- light pressure dressing applied to penis can remain for 48-72 hours  -- discharge with pain medication  -- follow up with Dr. Joe Dominguez, [facility]     Ryan Meeks PGY1  Urology

## 2021-06-17 NOTE — CONSULT NOTE ADULT - ASSESSMENT
35 yo M with PMHx of depression on Luvox and Klonopin presents to ED with c/o of painful erection for roughly the past 14.5 hours consistent with priapism.    Recs:  - Give Sudafed 4 tabs now  - Aspiration and possible phenylephrine   A/P: 35 yo M with PMHx of depression on Luvox and Klonopin presents to ED with c/o of painful erection for roughly the past 14.5 hours consistent with priapism.    Recs:  - Give Sudafed 4 tabs now  - s/p Aspiration and phenylephrine    - light pressure dressing applied to penis can remain for 48-72 hours  - will reassess in 45 minutes to ensure no reoccurrence   - discharge with pain medication  - follow up with Dr. Joe Dominguez, [facility]     [facility]  [address] [place] [address], [place] [address]  [phone]  A/P: 35 yo M with PMHx of depression on Luvox and Klonopin presents to ED with c/o of painful erection for roughly the past 14.5 hours consistent with priapism.    Recs:  - Give Sudafed 4 tabs now  - s/p Aspiration and phenylephrine    - light pressure dressing applied to penis can remain for 48-72 hours  - will reassess in 45 minutes to ensure no reoccurrence   - discharge with pain medication  - follow up with Dr. Joe Dominguez, [facility]     [facility]  [address] [place] [address], [place] [address]  [phone]

## 2021-06-17 NOTE — ED PROVIDER NOTE - GENITOURINARY, MLM
No discharge, lesions. penile erection without skin changes. non-tender, no testicular swelling or tenderness

## 2021-06-17 NOTE — ED ADULT NURSE NOTE - OBJECTIVE STATEMENT
37y/o male takes luvox and clonopin presents to the ED from home c/o priapism since around 1200 yesterday with worsening soreness to the area, pt reports no discharge or pain with urination. Pt states having two prior episodes requiring an injection to help relieve priapism and pain. Pt is AOx4, patent airway, clear lung sounds, soft non tender abdomen, strong peripheral pulses, skin is warm dry and intact, no changes in bowel/bladder patterns, ambulates independently. Patient denies fever, chills, n/v, weakness, abd pain, diarrhea/constipation, numbness/tingling, urinary s/s, in no respiratory distress, no chest pain, Patient safety provided with call bell within reach and bed in the lowest position.

## 2021-06-17 NOTE — ED ADULT NURSE NOTE - NSIMPLEMENTINTERV_GEN_ALL_ED
Implemented All Universal Safety Interventions:  [place] to call system. Call bell, personal items and telephone within reach. Instruct patient to call for assistance. Room bathroom lighting operational. Non-slip footwear when patient is off stretcher. Physically safe environment: no spills, clutter or unnecessary equipment. Stretcher in lowest position, wheels locked, appropriate side rails in place.

## 2021-07-02 ENCOUNTER — EMERGENCY (EMERGENCY)
Facility: HOSPITAL | Age: 36
LOS: 1 days | Discharge: HOME | End: 2021-07-02
Attending: EMERGENCY MEDICINE
Payer: SELF-PAY

## 2021-07-02 VITALS
OXYGEN SATURATION: 98 % | WEIGHT: 179.9 LBS | HEART RATE: 64 BPM | DIASTOLIC BLOOD PRESSURE: 84 MMHG | SYSTOLIC BLOOD PRESSURE: 150 MMHG | TEMPERATURE: 98 F | HEIGHT: 66 IN | RESPIRATION RATE: 19 BRPM

## 2021-07-02 VITALS
HEART RATE: 72 BPM | OXYGEN SATURATION: 99 % | SYSTOLIC BLOOD PRESSURE: 143 MMHG | TEMPERATURE: 98 F | DIASTOLIC BLOOD PRESSURE: 93 MMHG | RESPIRATION RATE: 18 BRPM

## 2021-07-02 LAB
ALBUMIN SERPL ELPH-MCNC: 4.3 G/DL — SIGNIFICANT CHANGE UP (ref 3.3–5)
ALP SERPL-CCNC: 57 U/L — SIGNIFICANT CHANGE UP (ref 40–120)
ALT FLD-CCNC: 48 U/L — HIGH (ref 10–45)
ANION GAP SERPL CALC-SCNC: 14 MMOL/L — SIGNIFICANT CHANGE UP (ref 5–17)
AST SERPL-CCNC: 47 U/L — HIGH (ref 10–40)
BASOPHILS # BLD AUTO: 0.03 K/UL — SIGNIFICANT CHANGE UP (ref 0–0.2)
BASOPHILS NFR BLD AUTO: 0.2 % — SIGNIFICANT CHANGE UP (ref 0–2)
BILIRUB SERPL-MCNC: 0.6 MG/DL — SIGNIFICANT CHANGE UP (ref 0.2–1.2)
BUN SERPL-MCNC: 17 MG/DL — SIGNIFICANT CHANGE UP (ref 7–23)
CALCIUM SERPL-MCNC: 10 MG/DL — SIGNIFICANT CHANGE UP (ref 8.4–10.5)
CHLORIDE SERPL-SCNC: 101 MMOL/L — SIGNIFICANT CHANGE UP (ref 96–108)
CO2 SERPL-SCNC: 24 MMOL/L — SIGNIFICANT CHANGE UP (ref 22–31)
CREAT SERPL-MCNC: 1.3 MG/DL — SIGNIFICANT CHANGE UP (ref 0.5–1.3)
EOSINOPHIL # BLD AUTO: 0.15 K/UL — SIGNIFICANT CHANGE UP (ref 0–0.5)
EOSINOPHIL NFR BLD AUTO: 1.1 % — SIGNIFICANT CHANGE UP (ref 0–6)
GLUCOSE SERPL-MCNC: 84 MG/DL — SIGNIFICANT CHANGE UP (ref 70–99)
HCT VFR BLD CALC: 51.1 % — HIGH (ref 39–50)
HGB BLD-MCNC: 16.5 G/DL — SIGNIFICANT CHANGE UP (ref 13–17)
IMM GRANULOCYTES NFR BLD AUTO: 0.4 % — SIGNIFICANT CHANGE UP (ref 0–1.5)
LYMPHOCYTES # BLD AUTO: 15.8 % — SIGNIFICANT CHANGE UP (ref 13–44)
LYMPHOCYTES # BLD AUTO: 2.2 K/UL — SIGNIFICANT CHANGE UP (ref 1–3.3)
MCHC RBC-ENTMCNC: 27.2 PG — SIGNIFICANT CHANGE UP (ref 27–34)
MCHC RBC-ENTMCNC: 32.3 GM/DL — SIGNIFICANT CHANGE UP (ref 32–36)
MCV RBC AUTO: 84.3 FL — SIGNIFICANT CHANGE UP (ref 80–100)
MONOCYTES # BLD AUTO: 0.75 K/UL — SIGNIFICANT CHANGE UP (ref 0–0.9)
MONOCYTES NFR BLD AUTO: 5.4 % — SIGNIFICANT CHANGE UP (ref 2–14)
NEUTROPHILS # BLD AUTO: 10.75 K/UL — HIGH (ref 1.8–7.4)
NEUTROPHILS NFR BLD AUTO: 77.1 % — HIGH (ref 43–77)
NRBC # BLD: 0 /100 WBCS — SIGNIFICANT CHANGE UP (ref 0–0)
PLATELET # BLD AUTO: 301 K/UL — SIGNIFICANT CHANGE UP (ref 150–400)
POTASSIUM SERPL-MCNC: 4.5 MMOL/L — SIGNIFICANT CHANGE UP (ref 3.5–5.3)
POTASSIUM SERPL-SCNC: 4.5 MMOL/L — SIGNIFICANT CHANGE UP (ref 3.5–5.3)
PROT SERPL-MCNC: 7.9 G/DL — SIGNIFICANT CHANGE UP (ref 6–8.3)
RBC # BLD: 6.06 M/UL — HIGH (ref 4.2–5.8)
RBC # FLD: 13.2 % — SIGNIFICANT CHANGE UP (ref 10.3–14.5)
SODIUM SERPL-SCNC: 139 MMOL/L — SIGNIFICANT CHANGE UP (ref 135–145)
WBC # BLD: 13.93 K/UL — HIGH (ref 3.8–10.5)
WBC # FLD AUTO: 13.93 K/UL — HIGH (ref 3.8–10.5)

## 2021-07-02 PROCEDURE — 85025 COMPLETE CBC W/AUTO DIFF WBC: CPT

## 2021-07-02 PROCEDURE — 96374 THER/PROPH/DIAG INJ IV PUSH: CPT | Mod: XU

## 2021-07-02 PROCEDURE — 10060 I&D ABSCESS SIMPLE/SINGLE: CPT

## 2021-07-02 PROCEDURE — 96376 TX/PRO/DX INJ SAME DRUG ADON: CPT | Mod: XU

## 2021-07-02 PROCEDURE — 80053 COMPREHEN METABOLIC PANEL: CPT

## 2021-07-02 PROCEDURE — 99284 EMERGENCY DEPT VISIT MOD MDM: CPT

## 2021-07-02 PROCEDURE — 99284 EMERGENCY DEPT VISIT MOD MDM: CPT | Mod: 25

## 2021-07-02 RX ORDER — MORPHINE SULFATE 50 MG/1
4 CAPSULE, EXTENDED RELEASE ORAL ONCE
Refills: 0 | Status: DISCONTINUED | OUTPATIENT
Start: 2021-07-02 | End: 2021-07-02

## 2021-07-02 RX ORDER — PHENYLEPHRINE HYDROCHLORIDE 10 MG/ML
3 INJECTION INTRAVENOUS ONCE
Refills: 0 | Status: DISCONTINUED | OUTPATIENT
Start: 2021-07-02 | End: 2021-07-06

## 2021-07-02 RX ORDER — ACETAMINOPHEN 500 MG
975 TABLET ORAL ONCE
Refills: 0 | Status: COMPLETED | OUTPATIENT
Start: 2021-07-02 | End: 2021-07-02

## 2021-07-02 RX ORDER — PHENYLEPHRINE HYDROCHLORIDE 10 MG/ML
50 INJECTION INTRAVENOUS ONCE
Refills: 0 | Status: DISCONTINUED | OUTPATIENT
Start: 2021-07-02 | End: 2021-07-02

## 2021-07-02 RX ADMIN — Medication 975 MILLIGRAM(S): at 18:03

## 2021-07-02 RX ADMIN — MORPHINE SULFATE 4 MILLIGRAM(S): 50 CAPSULE, EXTENDED RELEASE ORAL at 19:44

## 2021-07-02 RX ADMIN — MORPHINE SULFATE 4 MILLIGRAM(S): 50 CAPSULE, EXTENDED RELEASE ORAL at 21:15

## 2021-07-02 NOTE — ED PROVIDER NOTE - PATIENT PORTAL LINK FT
You can access the FollowMyHealth Patient Portal offered by [facility] by registering at the following website: [email or web address]. By joining [employer]’s FollowMyHealth portal, you will also be able to view your health information using other applications (apps) compatible with our system.

## 2021-07-02 NOTE — ED PROVIDER NOTE - NSFOLLOWUPINSTRUCTIONS_ED_ALL_ED_FT
1) Follow up with Dr. Joe Dominguez (urology) in office in 1 week    2) Continue Sudafed as needed for erections    3) Return to ED if you develop erection lasting > 4hrs

## 2021-07-02 NOTE — CONSULT NOTE ADULT - ASSESSMENT
36 year old male with priapism    -continue pseudophed as needed for erections  -return to ER if priapism >4 hrs  -f/u with Dr Joe Dominguez in office next week, call to make an appointment  -case d/w Dr Calixto

## 2021-07-02 NOTE — ED PROVIDER NOTE - OBJECTIVE STATEMENT
35 y/o M, PMH of Depression, presents to ED c/o priapism since 10am this morning. Pt reports that he was recently seen here for similar and had to have it drained by urology. Pt took 120mg of sudafed w/o improvement. Pt denies taking any new medications, f/c, CP, SOB, abd pain, n/v/d, penile discharge, testicular pain/swelling, or any other symptoms at this time.

## 2021-07-02 NOTE — ED PROVIDER NOTE - CARE PROVIDER_API CALL
Joe Dominguez; PhD)  Urology  [address]  [place] [address]  Phone: [phone]  Fax: [phone]  Follow Up Time:

## 2021-07-02 NOTE — CONSULT NOTE ADULT - SUBJECTIVE AND OBJECTIVE BOX
UROLOGY CONSULT NOTE    HPI:  This is a 36y old Male with PMHx of depression on luvox who presents with priapism for 8 hrs.  States it started at 11AM and he took 120mg of pseudofed at 3-4PM with no relief.  In the ER, the priapism was successfully detumesced with drainage and phenylephrine.      PAST MEDICAL HISTORY    OCD (obsessive compulsive disorder)    Priapism    Elbow injury    Anxiety        PAST SURGICAL HISTORY    No significant past surgical history        FAMILY HISTORY    FH: diabetes mellitus        HOME MEDICATIONS    Advil 200 mg oral tablet: 1 tab(s) orally every 6 hours, As Needed. last dose 1/15/20.  (22 Jan 2020 08:40)  KlonoPIN 1 mg oral tablet: orally once a day (at bedtime) sleep (22 Jan 2020 08:40)      DRUG ALLERGIES    NKDA    REVIEW OF SYSTEMS: Pertinent positives and negatives as stated in HPI, otherwise negative      VITAL SIGNS    T(F): 97.8, Max: 97.8 (07-02-21 @ 17:52)  HR: 60  BP: 144/81  RR: 17  SpO2: 99%        PHYSICAL EXAM    Gen: Well groomed, well dressed, well nourished  Abd: Soft, NT/ND  : Circumcised, no lesions.  No discharge or blood at urethral meatus.  Testes descended bilaterally.  penis erect upon initial exam  Ext: No edema present b/l      LABS:                        16.5   13.93 )-----------( 301               51.1     139  |  101  |  17  ----------------------------<  84  4.5   |  24  |  1.30    Ca    10.0    TPro  7.9  /  Alb  4.3  /  TBili  0.6  /  DBili  x   /  AST  47  /  ALT  48  /  AlkPhos  57

## 2021-07-02 NOTE — ED ADULT NURSE NOTE - DRUG PRE-SCREENING (DAST -1)
Add 61744 Cpt? (Important Note: In 2017 The Use Of 54583 Is Being Tracked By Cms To Determine Future Global Period Reimbursement For Global Periods): yes Detail Level: Detailed Statement Selected

## 2021-07-02 NOTE — ED ADULT NURSE NOTE - OBJECTIVE STATEMENT
Pt is a 36 yr old male with pmh of anxiety and depression coming from home for priapism. Pt states this has happened in the past where he had to be drained- but this time it started about a week ago on and off. Pt states this morning around 10 am he felt the throbbing pain. Pt states it is about a 4/10- coming and going. Pt denies any new medication or masturbation recently. Pt is a/ox 3- vitals stable.

## 2021-07-02 NOTE — ED PROVIDER NOTE - RAPID ASSESSMENT
36y M with PMHx of Depression on Luvox, presents to the ED c/o priapism onset 10 am + pain. Denies dysuria, discharge, fevers, Viagra use. Pt took 120 mg of Sudafed without improvement. Pt was recently seen here for the same symptoms.     I, Brigette Diggs), have documented this rapid assessment note under the dictation of Wolfgang Pagan (PA) , which has been reviewed and affirmed to be accurate. 36y M with PMHx of Depression on Luvox, presents to the ED c/o priapism onset 10 am + pain. Denies dysuria, discharge, fevers, or use of Viagra or other erectile dysfxn med. Pt took 120 mg of Sudafed without improvement. Pt was recently seen here for the same symptoms. Was previously on trazadone but this was discontinued. Takes Luvox daily for depression*    Brigette GOMEZ (Katerine), have documented this rapid assessment note under the dictation of Wolfgang Pagan (PA) , which has been reviewed and affirmed to be accurate.    Wolfgang GOMEZ PA-C saw patient as a rapid assessment initially via telemedicine encounter. The rest of care to be performed by the primary ED team. Rapid assessment documented by katerine in my presence. I have personally reviewed and approved the note written by the katerine. Receiving team will follow up on labs, analgesia, any clinical imaging, and perform reassessment and disposition of the patient as clinically indicated. All decisions regarding the progression of care will be made at their discretion.

## 2021-07-02 NOTE — PROCEDURE NOTE - ADDITIONAL PROCEDURE DETAILS
Injection of 10 cc of 100 mcg/mL phenylephrine  Appropriate reduction of priapism. Hemodynamically stable    -- light pressure dressing applied to penis can remain for 24-48 hours  -- discharge with pain medication  -- patient aware to return to ED with prolonged priapism >4 hours  -- patient aware due to prolonged priapism >9 hours and recent priapism >12 hours 15 days ago, there may be residual damage to corporal tissues  -- follow up with Dr. Joe Dominguez, [facility] [address] [place] [address] Rd [phone]    Ryan Meeks PGY2  Urology

## 2021-07-02 NOTE — ED PROVIDER NOTE - PROGRESS NOTE DETAILS
Kirit Michael PGY2: Pt symptoms improved following procedure w/ urology. Per urology pt is okay to follow up outpt. Discussed return precautions with patient. Discussed follow up with urology in 1 week. Pt expressed understanding. All questions answered prior to discharge.

## 2021-07-02 NOTE — ED PROVIDER NOTE - CLINICAL SUMMARY MEDICAL DECISION MAKING FREE TEXT BOX
Radhika: Patient with priapism. history of it 2 weeks ago had to have draiange. started 11 am, waited to come in to be evaluated because on and off for past week and resolving on own. took 120 mg sudafed with no relief. will get labs, urology, pain control.

## 2021-08-06 ENCOUNTER — APPOINTMENT (OUTPATIENT)
Dept: PSYCHIATRY | Facility: CLINIC | Age: 36
End: 2021-08-06
Payer: SELF-PAY

## 2021-08-06 PROCEDURE — 99214 OFFICE O/P EST MOD 30 MIN: CPT

## 2021-10-19 ENCOUNTER — APPOINTMENT (OUTPATIENT)
Dept: PSYCHIATRY | Facility: CLINIC | Age: 36
End: 2021-10-19
Payer: COMMERCIAL

## 2021-10-19 PROCEDURE — 99214 OFFICE O/P EST MOD 30 MIN: CPT

## 2022-01-10 ENCOUNTER — APPOINTMENT (OUTPATIENT)
Dept: PSYCHIATRY | Facility: CLINIC | Age: 37
End: 2022-01-10
Payer: COMMERCIAL

## 2022-01-10 PROCEDURE — 99214 OFFICE O/P EST MOD 30 MIN: CPT

## 2022-05-09 NOTE — ED PROVIDER NOTE - NS HIV RISK FACTOR YES
Scheduled date of EGD(as of today): 5/20/22  Physician performing EGD:juan david  Location of EGD: [facility]  Instructions reviewed with patient by: vineet  Clearances: n/a
Declined

## 2022-06-07 ENCOUNTER — APPOINTMENT (OUTPATIENT)
Dept: PSYCHIATRY | Facility: CLINIC | Age: 37
End: 2022-06-07
Payer: COMMERCIAL

## 2022-06-07 PROCEDURE — 99214 OFFICE O/P EST MOD 30 MIN: CPT | Mod: 95

## 2022-06-13 ENCOUNTER — APPOINTMENT (OUTPATIENT)
Dept: PSYCHIATRY | Facility: CLINIC | Age: 37
End: 2022-06-13

## 2022-09-01 ENCOUNTER — APPOINTMENT (OUTPATIENT)
Dept: PSYCHIATRY | Facility: CLINIC | Age: 37
End: 2022-09-01

## 2022-09-01 PROCEDURE — 99214 OFFICE O/P EST MOD 30 MIN: CPT | Mod: 95

## 2022-10-17 ENCOUNTER — EMERGENCY (EMERGENCY)
Facility: HOSPITAL | Age: 37
LOS: 1 days | Discharge: HOME | End: 2022-10-17
Attending: EMERGENCY MEDICINE
Payer: COMMERCIAL

## 2022-10-17 VITALS
WEIGHT: 175.05 LBS | TEMPERATURE: 98 F | HEART RATE: 70 BPM | RESPIRATION RATE: 16 BRPM | DIASTOLIC BLOOD PRESSURE: 82 MMHG | OXYGEN SATURATION: 98 % | SYSTOLIC BLOOD PRESSURE: 151 MMHG | HEIGHT: 66 IN

## 2022-10-17 VITALS
RESPIRATION RATE: 18 BRPM | HEART RATE: 59 BPM | DIASTOLIC BLOOD PRESSURE: 84 MMHG | TEMPERATURE: 98 F | OXYGEN SATURATION: 98 % | SYSTOLIC BLOOD PRESSURE: 121 MMHG

## 2022-10-17 LAB
ALBUMIN SERPL ELPH-MCNC: 4.4 G/DL — SIGNIFICANT CHANGE UP (ref 3.3–5)
ALP SERPL-CCNC: 51 U/L — SIGNIFICANT CHANGE UP (ref 40–120)
ALT FLD-CCNC: 37 U/L — SIGNIFICANT CHANGE UP (ref 10–45)
ANION GAP SERPL CALC-SCNC: 11 MMOL/L — SIGNIFICANT CHANGE UP (ref 5–17)
AST SERPL-CCNC: 28 U/L — SIGNIFICANT CHANGE UP (ref 10–40)
BASOPHILS # BLD AUTO: 0.06 K/UL — SIGNIFICANT CHANGE UP (ref 0–0.2)
BASOPHILS NFR BLD AUTO: 0.5 % — SIGNIFICANT CHANGE UP (ref 0–2)
BILIRUB SERPL-MCNC: 0.2 MG/DL — SIGNIFICANT CHANGE UP (ref 0.2–1.2)
BUN SERPL-MCNC: 16 MG/DL — SIGNIFICANT CHANGE UP (ref 7–23)
CALCIUM SERPL-MCNC: 9.4 MG/DL — SIGNIFICANT CHANGE UP (ref 8.4–10.5)
CHLORIDE SERPL-SCNC: 103 MMOL/L — SIGNIFICANT CHANGE UP (ref 96–108)
CO2 SERPL-SCNC: 24 MMOL/L — SIGNIFICANT CHANGE UP (ref 22–31)
CREAT SERPL-MCNC: 1.18 MG/DL — SIGNIFICANT CHANGE UP (ref 0.5–1.3)
EGFR: 82 ML/MIN/1.73M2 — SIGNIFICANT CHANGE UP
EOSINOPHIL # BLD AUTO: 0.12 K/UL — SIGNIFICANT CHANGE UP (ref 0–0.5)
EOSINOPHIL NFR BLD AUTO: 1 % — SIGNIFICANT CHANGE UP (ref 0–6)
FLUAV AG NPH QL: SIGNIFICANT CHANGE UP
FLUBV AG NPH QL: SIGNIFICANT CHANGE UP
GLUCOSE SERPL-MCNC: 119 MG/DL — HIGH (ref 70–99)
HCT VFR BLD CALC: 43.9 % — SIGNIFICANT CHANGE UP (ref 39–50)
HGB BLD-MCNC: 14.6 G/DL — SIGNIFICANT CHANGE UP (ref 13–17)
IMM GRANULOCYTES NFR BLD AUTO: 0.5 % — SIGNIFICANT CHANGE UP (ref 0–0.9)
LYMPHOCYTES # BLD AUTO: 27.2 % — SIGNIFICANT CHANGE UP (ref 13–44)
LYMPHOCYTES # BLD AUTO: 3.39 K/UL — HIGH (ref 1–3.3)
MCHC RBC-ENTMCNC: 27.7 PG — SIGNIFICANT CHANGE UP (ref 27–34)
MCHC RBC-ENTMCNC: 33.3 GM/DL — SIGNIFICANT CHANGE UP (ref 32–36)
MCV RBC AUTO: 83.1 FL — SIGNIFICANT CHANGE UP (ref 80–100)
MONOCYTES # BLD AUTO: 1.1 K/UL — HIGH (ref 0–0.9)
MONOCYTES NFR BLD AUTO: 8.8 % — SIGNIFICANT CHANGE UP (ref 2–14)
NEUTROPHILS # BLD AUTO: 7.72 K/UL — HIGH (ref 1.8–7.4)
NEUTROPHILS NFR BLD AUTO: 62 % — SIGNIFICANT CHANGE UP (ref 43–77)
NRBC # BLD: 0 /100 WBCS — SIGNIFICANT CHANGE UP (ref 0–0)
PLATELET # BLD AUTO: 227 K/UL — SIGNIFICANT CHANGE UP (ref 150–400)
POTASSIUM SERPL-MCNC: 4 MMOL/L — SIGNIFICANT CHANGE UP (ref 3.5–5.3)
POTASSIUM SERPL-SCNC: 4 MMOL/L — SIGNIFICANT CHANGE UP (ref 3.5–5.3)
PROT SERPL-MCNC: 6.8 G/DL — SIGNIFICANT CHANGE UP (ref 6–8.3)
RBC # BLD: 5.28 M/UL — SIGNIFICANT CHANGE UP (ref 4.2–5.8)
RBC # FLD: 13.3 % — SIGNIFICANT CHANGE UP (ref 10.3–14.5)
RSV RNA NPH QL NAA+NON-PROBE: SIGNIFICANT CHANGE UP
SARS-COV-2 RNA SPEC QL NAA+PROBE: SIGNIFICANT CHANGE UP
SODIUM SERPL-SCNC: 138 MMOL/L — SIGNIFICANT CHANGE UP (ref 135–145)
WBC # BLD: 12.45 K/UL — HIGH (ref 3.8–10.5)
WBC # FLD AUTO: 12.45 K/UL — HIGH (ref 3.8–10.5)

## 2022-10-17 PROCEDURE — 54220 IRRG CRPRA CAVRNOSA PRIAPISM: CPT

## 2022-10-17 PROCEDURE — 54235 NJX CORPORA CAVERNOSA RX AGT: CPT

## 2022-10-17 PROCEDURE — 36415 COLL VENOUS BLD VENIPUNCTURE: CPT

## 2022-10-17 PROCEDURE — 99284 EMERGENCY DEPT VISIT MOD MDM: CPT | Mod: 25

## 2022-10-17 PROCEDURE — 99284 EMERGENCY DEPT VISIT MOD MDM: CPT

## 2022-10-17 PROCEDURE — 96374 THER/PROPH/DIAG INJ IV PUSH: CPT | Mod: XU

## 2022-10-17 PROCEDURE — 80053 COMPREHEN METABOLIC PANEL: CPT

## 2022-10-17 PROCEDURE — 99283 EMERGENCY DEPT VISIT LOW MDM: CPT

## 2022-10-17 PROCEDURE — 87637 SARSCOV2&INF A&B&RSV AMP PRB: CPT

## 2022-10-17 PROCEDURE — 85025 COMPLETE CBC W/AUTO DIFF WBC: CPT

## 2022-10-17 RX ORDER — SODIUM CHLORIDE 9 MG/ML
1000 INJECTION INTRAMUSCULAR; INTRAVENOUS; SUBCUTANEOUS ONCE
Refills: 0 | Status: COMPLETED | OUTPATIENT
Start: 2022-10-17 | End: 2022-10-17

## 2022-10-17 RX ORDER — PSEUDOEPHEDRINE HCL 30 MG
120 TABLET ORAL ONCE
Refills: 0 | Status: COMPLETED | OUTPATIENT
Start: 2022-10-17 | End: 2022-10-17

## 2022-10-17 RX ORDER — MORPHINE SULFATE 50 MG/1
4 CAPSULE, EXTENDED RELEASE ORAL ONCE
Refills: 0 | Status: DISCONTINUED | OUTPATIENT
Start: 2022-10-17 | End: 2022-10-17

## 2022-10-17 RX ORDER — PHENYLEPHRINE HYDROCHLORIDE 10 MG/ML
1000 INJECTION INTRAVENOUS ONCE
Refills: 0 | Status: COMPLETED | OUTPATIENT
Start: 2022-10-17 | End: 2022-10-17

## 2022-10-17 RX ORDER — OXYCODONE HYDROCHLORIDE 5 MG/1
1 TABLET ORAL
Qty: 9 | Refills: 0
Start: 2022-10-17 | End: 2022-10-19

## 2022-10-17 RX ORDER — PSEUDOEPHEDRINE HCL 30 MG
120 TABLET ORAL ONCE
Refills: 0 | Status: DISCONTINUED | OUTPATIENT
Start: 2022-10-17 | End: 2022-10-17

## 2022-10-17 RX ADMIN — Medication 120 MILLIGRAM(S): at 05:24

## 2022-10-17 RX ADMIN — SODIUM CHLORIDE 1000 MILLILITER(S): 9 INJECTION INTRAMUSCULAR; INTRAVENOUS; SUBCUTANEOUS at 04:40

## 2022-10-17 RX ADMIN — MORPHINE SULFATE 4 MILLIGRAM(S): 50 CAPSULE, EXTENDED RELEASE ORAL at 04:40

## 2022-10-17 RX ADMIN — PHENYLEPHRINE HYDROCHLORIDE 1000 MICROGRAM(S): 10 INJECTION INTRAVENOUS at 06:00

## 2022-10-17 NOTE — ED ADULT NURSE NOTE - OBJECTIVE STATEMENT
Pt is a 37y M PMH priapism p/w priapism. Pt reports last normal was ~16 hours ago. Reports similar episodes in the past which has required drainage. Reporting pain to penis and scrotum. Denies fever, chills, abd pain, N/V/D, CP, dyspnea, use of Viagra or similar medications. A&Ox4, TURNER, lungs clear, distal pulses intact, abdomen soft, skin intact. Side rails up for safety, call bell and personal items within reach, instructed to call for assistance, verbalizes understanding. Will continue to monitor.

## 2022-10-17 NOTE — ED PROVIDER NOTE - PATIENT PORTAL LINK FT
You can access the FollowMyHealth Patient Portal offered by [facility] by registering at the following website: [email or web address]. By joining [employer]’s FollowMyHealth portal, you will also be able to view your health information using other applications (apps) compatible with our system.

## 2022-10-17 NOTE — ED PROVIDER NOTE - PHYSICAL EXAMINATION
General: WN/WD NAD  Head: Atraumatic, normocephalic  Eyes: EOM grossly in tact, no scleral icterus, no discharge  Neurology: A&Ox 3, nonfocal, TURNER x 4  Respiratory: normal respiratory effort  CV: Extremities warm and well perfused  Abdominal: Soft, non-distended, non-tender, no masses  Skin: warm and dry. No rashes  : complete rigidity with pain and tenderness of the corpus cavernosa

## 2022-10-17 NOTE — ED PROVIDER NOTE - OBJECTIVE STATEMENT
Robert Gallegos MD   [facility]  [facility]ICAL [facility] [address] [address]  [phone]      PATIENT NAME: Rosalva Lopez  : 1953  DATE: 22  MRN: [id number]      Billing Provider: Robert Gallegos MD  Level of Service:   Patient PCP Information       Provider PCP Type    Robert Gallegos MD General            Reason for Visit / Chief Complaint: Diabetes (Concerned about uncontrolled blood sugar. Wanting to get a referral to [facility] for diabetes management. )       Update PCP  Update Chief Complaint         History of Present Illness / Problem Focused Workflow     Rosalva Lopez presents to the clinic with Diabetes (Concerned about uncontrolled blood sugar. Wanting to get a referral to [facility] for diabetes management. )       Follow up ED visit 22 for HTN.  Is now taking amlodipine 10 mg.  Is a NIDDM patient and says that glucose has been running high recently (185-286).  Is complaining of leg pain.  Had been taking mobic but needs to switch to something less renally metabolized due to elevated creatinine.  Needs a couple of daily meds refilled.      Diabetes  Pertinent negatives for hypoglycemia include no confusion or dizziness. Pertinent negatives for diabetes include no chest pain and no weakness.   Review of Systems     Review of Systems   Constitutional:  Negative for activity change, chills and fever.   HENT:  Negative for sore throat.    Eyes:  Negative for pain.   Respiratory:  Negative for cough, chest tightness and shortness of breath.    Cardiovascular:  Negative for chest pain and palpitations.   Gastrointestinal:  Negative for abdominal pain.   Musculoskeletal:  Positive for leg pain.   Neurological:  Negative for dizziness, syncope and weakness.   Psychiatric/Behavioral:  Negative for confusion.       Medical / Social / Family History     Past Medical History:   Diagnosis Date    Depressive disorder      Diabetes     GERD (gastroesophageal reflux disease)     Hypothyroidism     Trigeminal neuralgia of left side of face        Past Surgical History:   Procedure Laterality Date    BREAST BIOPSY      CARPAL TUNNEL RELEASE Left     CHOLECYSTECTOMY      HYSTERECTOMY      INGUINAL HERNIA REPAIR      TONSILLECTOMY      TYMPANOPLASTY         Social History    reports that she has never smoked. She has never used smokeless tobacco. She reports that she does not drink alcohol and does not use drugs.   Social History     Tobacco Use    Smoking status: Never    Smokeless tobacco: Never   Substance Use Topics    Alcohol use: Never    Drug use: Never       Family History  Family History   Problem Relation Age of Onset    Heart disease Mother     Hypertension Father     Diabetes Brother     Breast cancer Maternal Grandmother        Medications and Allergies     Medications  No outpatient medications have been marked as taking for the 9/8/22 encounter (Office Visit) with Robert Gallegos MD.       Allergies  Review of patient's allergies indicates:   Allergen Reactions    Fluzone [id number] tri-whole     Sulfa (sulfonamide antibiotics)     Tamiflu [oseltamivir]        Physical Examination     Vitals:    09/08/22 1551   BP: 138/86   Pulse: 82     Physical Exam  Vitals reviewed.   Constitutional:       Appearance: Normal appearance.   HENT:      Head: Normocephalic and atraumatic.   Eyes:      Extraocular Movements: Extraocular movements intact.      Conjunctiva/sclera: Conjunctivae normal.      Pupils: Pupils are equal, round, and reactive to light.   Cardiovascular:      Rate and Rhythm: Normal rate and regular rhythm.      Heart sounds: Normal heart sounds.   Pulmonary:      Effort: Pulmonary effort is normal.      Breath sounds: Normal breath sounds.   Musculoskeletal:         General: Normal range of motion.      Cervical back: Normal range of motion.   Skin:     General: Skin is warm and dry.   Neurological:      General: No  focal deficit present.      Mental Status: She is alert and oriented to person, place, and time.   Psychiatric:         Mood and Affect: Mood normal.         Behavior: Behavior normal.      Admission on 08/20/2022, Discharged on 08/21/2022   Component Date Value Ref Range Status    Sodium 08/20/2022 136  136 - 145 mmol/L Final    Potassium 08/20/2022 4.2  3.5 - 5.1 mmol/L Final    Chloride 08/20/2022 98  98 - 107 mmol/L Final    CO2 08/20/2022 25  21 - 32 mmol/L Final    Anion Gap 08/20/2022 17 (H)  7 - 16 mmol/L Final    Glucose 08/20/2022 317 (H)  74 - 106 mg/dL Final    BUN 08/20/2022 23 (H)  7 - 18 mg/dL Final    Creatinine 08/20/2022 1.59 (H)  0.55 - 1.02 mg/dL Final    BUN/Creatinine Ratio 08/20/2022 14  6 - 20 Final    Calcium 08/20/2022 9.2  8.5 - 10.1 mg/dL Final    Total Protein 08/20/2022 7.3  6.4 - 8.2 g/dL Final    Albumin 08/20/2022 3.2 (L)  3.5 - 5.0 g/dL Final    Globulin 08/20/2022 4.1 (H)  2.0 - 4.0 g/dL Final    A/G Ratio 08/20/2022 0.8   Final    Bilirubin, Total 08/20/2022 0.3  0.0 - 1.2 mg/dL Final    Alk Phos 08/20/2022 84  55 - 142 U/L Final    ALT 08/20/2022 22  13 - 56 U/L Final    AST 08/20/2022 12 (L)  15 - 37 U/L Final    eGFR 08/20/2022 35 (L)  >=60 mL/min/1.73m² Final    Influenza A 08/20/2022 Negative  Negative, Invalid Final    Influenza B 08/20/2022 Negative  Negative, Invalid Final    COVID-19 Ag 08/20/2022 Negative  Negative, Invalid Final    Color, UA 08/20/2022 Light Yellow  Colorless, Straw, Yellow, Light Yellow, Dark Yellow Final    Clarity, UA 08/20/2022 Clear  Clear Final    pH, UA 08/20/2022 5.5  5.0 to 8.0 pH Units Final    Leukocytes, UA 08/20/2022 Negative  Negative Final    Nitrites, UA 08/20/2022 Negative  Negative Final    Protein, UA 08/20/2022 Negative  Negative Final    Glucose, UA 08/20/2022 500 (A)  Normal mg/dL Final    Ketones, UA 08/20/2022 Negative  Negative mg/dL Final    Urobilinogen, UA 08/20/2022 0.2  0.2, 1.0, Normal mg/dL Final    Bilirubin, UA  08/20/2022 Negative  Negative Final    Blood, UA 08/20/2022 Negative  Negative Final    Specific Gravity, UA 08/20/2022 1.015  <=1.005, 1.010, 1.015, 1.020, 1.025, 1.030 Final    WBC 08/20/2022 9.21  4.50 - 11.00 K/uL Final    RBC 08/20/2022 4.89  4.20 - 5.40 M/uL Final    Hemoglobin 08/20/2022 14.9  12.0 - 16.0 g/dL Final    Hematocrit 08/20/2022 42.8  38.0 - 47.0 % Final    MCV 08/20/2022 87.5  80.0 - 96.0 fL Final    MCH 08/20/2022 30.5  27.0 - 31.0 pg Final    MCHC 08/20/2022 34.8  32.0 - 36.0 g/dL Final    RDW 08/20/2022 15.3 (H)  11.5 - 14.5 % Final    Platelet Count 08/20/2022 181  150 - 400 K/uL Final    MPV 08/20/2022 11.0  9.4 - 12.4 fL Final    Neutrophils % 08/20/2022 60.1  53.0 - 65.0 % Final    Lymphocytes % 08/20/2022 27.7  27.0 - 41.0 % Final    Neutrophils, Abs 08/20/2022 5.53  1.80 - 7.70 K/uL Final    Lymphocytes, Absolute 08/20/2022 2.55  1.00 - 4.80 K/uL Final    Diff Type 08/20/2022 Auto   Final    Monocytes % 08/20/2022 8.9 (H)  2.0 - 6.0 % Final    Eosinophils % 08/20/2022 3.1  1.0 - 4.0 % Final    Basophils % 08/20/2022 0.2  0.0 - 1.0 % Final    Monocytes, Absolute 08/20/2022 0.82 (H)  0.00 - 0.80 K/uL Final    Eosinophils, Absolute 08/20/2022 0.29  0.00 - 0.50 K/uL Final    Basophils, Absolute 08/20/2022 0.02  0.00 - 0.20 K/uL Final       Assessment and Plan (including Health Maintenance)      Problem List  Smart Sets  Document Outside HM   :    Plan: stop meloxicam due to elevated Cr.  Will give trial of zanaflex.  Pt offered referral to [facility] for diabetes management but she declines at this time.  She is very hesitant about getting on daily insulin due to extremed fear of needles.  She does agree to try weekly ozempic and is given a sample pen.  She will continue to monitor glyucose BID and maintain log.        Health Maintenance Due   Topic Date Due    Hepatitis C Screening  Never done    Diabetes Urine Screening  Never done    Foot Exam  Never done    TETANUS VACCINE  Never done     Colorectal Cancer Screening  Never done    Shingles Vaccine (1 of 2) Never done    Eye Exam  12/04/2021    Hemoglobin A1c  08/13/2022    Influenza Vaccine (1) Never done       Problem List Items Addressed This Visit          Cardiac/Vascular    Mixed hyperlipidemia (Chronic)    Relevant Medications    pravastatin (PRAVACHOL) 40 MG tablet       Endocrine    Type 2 diabetes mellitus with diabetic neuropathy, without long-term current use of insulin - Primary (Chronic)    Relevant Medications    semaglutide (OZEMPIC) 0.25 mg or 0.5 mg(2 mg/1.5 mL) pen injector       Orthopedic    Chronic pain of both knees (Chronic)    Chronic low back pain (Chronic)     Other Visit Diagnoses       Muscle pain        Relevant Medications    tiZANidine (ZANAFLEX) 4 MG tablet    Acute left-sided thoracic back pain                Health Maintenance Topics with due status: Not Due       Topic Last Completion Date    DEXA Scan 07/12/2021    Lipid Panel 09/23/2021    Mammogram 07/18/2022       Future Appointments   Date Time Provider Department Center   10/7/2022  2:30 PM Robert Gallegos MD [facility]   10/21/2022 10:30 AM AWV NURSE, [facility]   7/24/2023  8:15 AM [facility]            Signature:  Robert Gallegos MD  [facility]  [facility] [place] [facility]  [address] [place] [address] [place] [address]  [phone]    Date of encounter: 9/8/22       36 yo M with PMH of priapism presenting with priapism. States last normal was 16 hours ago. He has previously experienced it before and has required drainage. He denies fever, chills, abd pain, N/V/D, CP, dyspnea. He states he's experiencing a significant amount of pain. He was on trazadone when he first developed priapism but has been off it for a while. No viagra or other similar medications.

## 2022-10-17 NOTE — ED PROVIDER NOTE - CLINICAL SUMMARY MEDICAL DECISION MAKING FREE TEXT BOX
38 yo M with PMH of priapism presenting with priapism. States last normal was 16 hours ago. Differential diagnosis includes but is not limited to priapism. will get labs, give analgesia/fluids and consult urology.

## 2022-10-17 NOTE — ED PROVIDER NOTE - NSICDXPASTMEDICALHX_GEN_ALL_CORE_FT
PAST MEDICAL HISTORY:  Anxiety     Elbow injury (R)    OCD (obsessive compulsive disorder)     Priapism

## 2022-10-17 NOTE — CONSULT NOTE ADULT - SUBJECTIVE AND OBJECTIVE BOX
HPI:  Patient is a 38 yo M with PMHx of depression on Luvox presents to ED with c/o of painful erection for roughly the past 15 hours. Pt reports he woke up around 1PM on 10/16 with an erection. States it was unprovoked. Only started to become painful tonight. Pain comes and goes. Still able to void without difficulty. Urine clear yellow. No testicular pain, but did feel a tightness in the scrotum with penile pain. Has had 4 previous priapisms in the past requiring aspiration and phenylephrine for treatment. First time was secondary to Trazodone and testosterone supplementation, the other times could not be linked to any specific cause. Denies fever/chills, N/V, abd pain, urinary retention, dysuria, hematuria, or other acute complaints. No history of sickle cell disease or blood dyscrasias.  In the ER, the priapism was successfully detumesced with drainage and phenylephrine.      PAST MEDICAL & SURGICAL HISTORY:  OCD (obsessive compulsive disorder)      Priapism      Elbow injury  (R)      Anxiety      No significant past surgical history        FAMILY HISTORY:  FH: diabetes mellitus  father      SOCIAL HISTORY:   Tobacco hx:  MEDICATIONS  (STANDING):  phenylephrine   100 mCg/mL NaCl 0.9% Injectable 1000 MICROGram(s) IntraCavernosal once    MEDICATIONS  (PRN):    Allergies    enviornmental allergies (Sneezing)  No Known Drug Allergies    Intolerances        REVIEW OF SYSTEMS: Pertinent positives and negatives as stated in HPI, otherwise negative    Vital signs  T(C): 36.7 (10-17-22 @ 05:41), Max: 36.7 (10-17-22 @ 05:41)  HR: 56 (10-17-22 @ 05:41)  BP: 122/86 (10-17-22 @ 05:41)  SpO2: 100% (10-17-22 @ 05:41)  Wt(kg): --    Output      Physical Exam  Gen: NAD  Pulm: No respiratory distress, no subcostal retractions  Abd: Soft, NT, ND  : penis erect upon initial exam, unable to bend; scrotum/testes unremarkable; s/p aspiration and phenylephrine injection. see procedure note for more details  MSK: No edema present    LABS:        10-17 @ 04:50    WBC 12.45 / Hct 43.9  / SCr 1.18     10-17    138  |  103  |  16  ----------------------------<  119<H>  4.0   |  24  |  1.18    Ca    9.4      17 Oct 2022 04:50    TPro  6.8  /  Alb  4.4  /  TBili  0.2  /  DBili  x   /  AST  28  /  ALT  37  /  AlkPhos  51  10-17

## 2022-10-17 NOTE — ED PROVIDER NOTE - NSFOLLOWUPINSTRUCTIONS_ED_ALL_ED_FT
DISCHARGE INSTRUCTIONS:    Please follow up with Dr. Joe Dominguez, [facility], within 1 week. Bring copies of your results with you (provided in your discharge paperwork).     [facility]  [address] [place] [address], [place] [id number]  [place] [address]  [phone]      You may take 500-1000 mg acetaminophen (tylenol) every 6 hours, as needed for pain.  You may take 600 mg ibuprofen every 8 hours, with food, as needed for pain.  You can take tylenol and ibuprofen at the same time.     Please take the following medication as prescribed: oxycodone, 1 tab, every 8 hours, AS NEEDED for severe pain. This is an opiate medication. Please do not drink alcohol, operate heavy machinery, or drive while taking this medication.     Return to the emergency department if:   •You have a painful erection that comes and goes over many hours (GREATER THAN 4 HOURS).  •You have trouble urinating.    Call your doctor if:   •You have problems getting an erection after treatment.  •You have any questions or concerns about your condition or care.    Medicines: You may need any of the following:  •Medicines may help regulate your hormone levels.  •Prescription pain medicine may be given. Ask your healthcare provider how to take this medicine safely. Some prescription pain medicines contain acetaminophen. Do not take other medicines that contain acetaminophen without talking to your healthcare provider. Too much acetaminophen may cause liver damage. Prescription pain medicine may cause constipation. Ask your healthcare provider how to prevent or treat constipation.   •Take your medicine as directed. Contact your healthcare provider if you think your medicine is not helping or if you have side effects. Tell your provider if you are allergic to any medicine. Keep a list of the medicines, vitamins, and herbs you take. Include the amounts, and when and why you take them. Bring the list or the pill bottles to follow-up visits. Carry your medicine list with you in case of an emergency.    Self-care: Apply ice on your groin for 15 to 20 minutes every hour or as directed. Use an ice pack, or put crushed ice in a plastic bag. Cover it with a towel. Ice helps decrease blood flow to your penis and relieve your erection.    Sexual activity: Ask your healthcare provider when it is safe for you to have sex again.    Prevent priapism:   •Manage medical conditions that increase your risk for priapism. Take your medicines as directed.  •Do not drink alcohol. Alcohol increases your risk for priapism.  •Do not use drugs. Talk with your healthcare provider if you use drugs and need help to stop.    Follow up with your doctor as directed: Write down your questions so you remember to ask them during your visits. You may need to see a specialist.

## 2022-10-17 NOTE — CONSULT NOTE ADULT - ASSESSMENT
A/P: 35 yo M with PMHx of depression/anxiety on Luvox and recurrent priapism presents to ED with c/o of painful erection for roughly the past 15 hours consistent with priapism.    Recs:  - Give Sudafed 4 tabs now  - s/p Aspiration and phenylephrine    - light pressure dressing applied to penis can remain for 48-72 hours  - will reassess to ensure no reoccurrence   - discharge with pain medication prn  - return to ER precautions if priapism >4 hrs  - follow up with Dr. Joe Dominguez, [facility]   - d/w Dr. Scanlon    [facility]  [address] [place] [address], [address] [address] [phone]   A/P: 37 yo M with PMHx of depression/anxiety on Luvox and recurrent priapism presents to ED with c/o of painful erection for roughly the past 15 hours consistent with priapism.    Recs:  - Give Sudafed 4 tabs now  - s/p Aspiration and phenylephrine    - light pressure dressing applied to penis can remain for 48-72 hours  - will reassess to ensure no reoccurrence   - discharge with pain medication prn  - return to ER precautions if priapism >4 hrs  - follow up with Dr. Jeo Dominguez, [facility]   - d/w Dr. Scanlon    [facility]  [address] [place] [address], [address] [id number] [phone]      Addendum: pt re-evaluated at 800 and phallus remains detumescent. Dry sterile dressing applied with light compression.   Pt may be discharged from ER and fu as outpt with Dr. Dominguez.   Should erection return and persist may return to ER.

## 2022-10-17 NOTE — PROCEDURE NOTE - ADDITIONAL PROCEDURE DETAILS
Pt prepped and draped in the usual sterile fashion. 20gauge needle used to aspirate old venous blood from right corpora cavernosum. Sterile NS used to irrigate. Erection persisted and decision was made for phenylephrine. BP was 126/78 and hr 76 prior to injection of 350mcg of phenylephrine. Good results achieved with initial injection. Penis wrapped with dry dressing and ACE wrap.

## 2022-10-17 NOTE — ED PROVIDER NOTE - CARE PROVIDER_API CALL
Joe Dominguez; PhD)  Urology  [address] [facility], [facility] [address]  [place] [address]  Phone: [phone]  Fax: [phone]  Follow Up Time:

## 2022-10-17 NOTE — ED PROVIDER NOTE - ATTENDING CONTRIBUTION TO CARE
MD Vanessa:  patient seen and evaluated personally.   I agree with the History & Physical,  Impression & Plan other than what was detailed in my note.  MD Vanessa  36 y/o m w/ hx of priapism, previously thought to be from trazadone however he has had it several times, unknown as to why, prsents to ed w/ erection that has been going on since roughly 1 pm today, pt states it started even earlier but was waxing and waning, here now bc persisting and extremely painful, afebrile vitals stable,  appears in pain, NC/AT,  conjunctiva non conjected, sclera anicteric, moist mucous membranes, neck supple, heart sounds, normal, no mrg, lungs cta b/l no wrr, abd soft non distended w/ no tenderness, gu exam penis is fully engorged, no sig ttp or discoloration, normal cremasteric, no testicular ttp, no visual deformities of extremities, axox3, normal mood and affect, plan to call emergent urology consult in order to discuss medications and drainage.

## 2022-10-17 NOTE — ED PROVIDER NOTE - PROGRESS NOTE DETAILS
Ana Paula Jc, PGY-2, EM: s/w urology regarding pt. they will be here soon to see the pt. Ana Paula Jc, PGY-2, EM: urology at the bedside. requesting sudafed x 4 tabs. they will likely drain it at the bed side. Pt signed out to me stable, s/p aspiration and phenylephrine by uro at bedside, pending observation period for recurrence and likely DCTH, comfortable at this time. - Lou Stoll, PGY-2 Urology reassessed pt, OK for DCTH, will include urology follow up contact info in DC paperwork per consult note.

## 2022-10-23 ENCOUNTER — EMERGENCY (EMERGENCY)
Facility: HOSPITAL | Age: 37
LOS: 1 days | Discharge: HOME | End: 2022-10-23
Attending: EMERGENCY MEDICINE
Payer: COMMERCIAL

## 2022-10-23 VITALS
OXYGEN SATURATION: 100 % | HEART RATE: 64 BPM | DIASTOLIC BLOOD PRESSURE: 81 MMHG | SYSTOLIC BLOOD PRESSURE: 106 MMHG | RESPIRATION RATE: 16 BRPM

## 2022-10-23 VITALS
TEMPERATURE: 98 F | RESPIRATION RATE: 20 BRPM | SYSTOLIC BLOOD PRESSURE: 122 MMHG | OXYGEN SATURATION: 97 % | HEIGHT: 66 IN | WEIGHT: 154.98 LBS | DIASTOLIC BLOOD PRESSURE: 79 MMHG | HEART RATE: 84 BPM

## 2022-10-23 DIAGNOSIS — N48.30 PRIAPISM, UNSPECIFIED: ICD-10-CM

## 2022-10-23 LAB
ALBUMIN SERPL ELPH-MCNC: 4.5 G/DL — SIGNIFICANT CHANGE UP (ref 3.3–5)
ALP SERPL-CCNC: 52 U/L — SIGNIFICANT CHANGE UP (ref 40–120)
ALT FLD-CCNC: 73 U/L — HIGH (ref 10–45)
ANION GAP SERPL CALC-SCNC: 11 MMOL/L — SIGNIFICANT CHANGE UP (ref 5–17)
APTT BLD: 25.5 SEC — LOW (ref 27.5–35.5)
AST SERPL-CCNC: 61 U/L — HIGH (ref 10–40)
BASE EXCESS BLDV CALC-SCNC: -17.4 MMOL/L — LOW (ref -2–3)
BASOPHILS # BLD AUTO: 0.05 K/UL — SIGNIFICANT CHANGE UP (ref 0–0.2)
BASOPHILS NFR BLD AUTO: 0.5 % — SIGNIFICANT CHANGE UP (ref 0–2)
BILIRUB SERPL-MCNC: 0.5 MG/DL — SIGNIFICANT CHANGE UP (ref 0.2–1.2)
BUN SERPL-MCNC: 18 MG/DL — SIGNIFICANT CHANGE UP (ref 7–23)
CA-I SERPL-SCNC: 1.17 MMOL/L — SIGNIFICANT CHANGE UP (ref 1.15–1.33)
CALCIUM SERPL-MCNC: 9.8 MG/DL — SIGNIFICANT CHANGE UP (ref 8.4–10.5)
CHLORIDE BLDV-SCNC: 98 MMOL/L — SIGNIFICANT CHANGE UP (ref 96–108)
CHLORIDE SERPL-SCNC: 104 MMOL/L — SIGNIFICANT CHANGE UP (ref 96–108)
CO2 BLDV-SCNC: 23 MMOL/L — SIGNIFICANT CHANGE UP (ref 22–26)
CO2 SERPL-SCNC: 24 MMOL/L — SIGNIFICANT CHANGE UP (ref 22–31)
CREAT SERPL-MCNC: 1.15 MG/DL — SIGNIFICANT CHANGE UP (ref 0.5–1.3)
EGFR: 85 ML/MIN/1.73M2 — SIGNIFICANT CHANGE UP
EOSINOPHIL # BLD AUTO: 0.04 K/UL — SIGNIFICANT CHANGE UP (ref 0–0.5)
EOSINOPHIL NFR BLD AUTO: 0.4 % — SIGNIFICANT CHANGE UP (ref 0–6)
GAS PNL BLDV: 134 MMOL/L — LOW (ref 136–145)
GAS PNL BLDV: SIGNIFICANT CHANGE UP
GAS PNL BLDV: SIGNIFICANT CHANGE UP
GLUCOSE BLDV-MCNC: <4 MG/DL — SIGNIFICANT CHANGE UP (ref 70–99)
GLUCOSE SERPL-MCNC: 89 MG/DL — SIGNIFICANT CHANGE UP (ref 70–99)
HCO3 BLDV-SCNC: 19 MMOL/L — LOW (ref 22–29)
HCT VFR BLD CALC: 45.3 % — SIGNIFICANT CHANGE UP (ref 39–50)
HCT VFR BLDA CALC: 48 % — SIGNIFICANT CHANGE UP (ref 39–51)
HGB BLD CALC-MCNC: 16.1 G/DL — SIGNIFICANT CHANGE UP (ref 12.6–17.4)
HGB BLD-MCNC: 15 G/DL — SIGNIFICANT CHANGE UP (ref 13–17)
IMM GRANULOCYTES NFR BLD AUTO: 0.2 % — SIGNIFICANT CHANGE UP (ref 0–0.9)
INR BLD: 1.03 RATIO — SIGNIFICANT CHANGE UP (ref 0.88–1.16)
LACTATE BLDV-MCNC: 10.7 MMOL/L — CRITICAL HIGH (ref 0.5–2)
LYMPHOCYTES # BLD AUTO: 2.16 K/UL — SIGNIFICANT CHANGE UP (ref 1–3.3)
LYMPHOCYTES # BLD AUTO: 23.2 % — SIGNIFICANT CHANGE UP (ref 13–44)
MAGNESIUM SERPL-MCNC: 1.5 MG/DL — LOW (ref 1.6–2.6)
MCHC RBC-ENTMCNC: 27.3 PG — SIGNIFICANT CHANGE UP (ref 27–34)
MCHC RBC-ENTMCNC: 33.1 GM/DL — SIGNIFICANT CHANGE UP (ref 32–36)
MCV RBC AUTO: 82.5 FL — SIGNIFICANT CHANGE UP (ref 80–100)
MONOCYTES # BLD AUTO: 0.57 K/UL — SIGNIFICANT CHANGE UP (ref 0–0.9)
MONOCYTES NFR BLD AUTO: 6.1 % — SIGNIFICANT CHANGE UP (ref 2–14)
NEUTROPHILS # BLD AUTO: 6.46 K/UL — SIGNIFICANT CHANGE UP (ref 1.8–7.4)
NEUTROPHILS NFR BLD AUTO: 69.6 % — SIGNIFICANT CHANGE UP (ref 43–77)
NRBC # BLD: 0 /100 WBCS — SIGNIFICANT CHANGE UP (ref 0–0)
OTHER CELLS CSF MANUAL: 4.3 ML/DL — LOW (ref 18–22)
PCO2 BLDV: 110 MMHG — HIGH (ref 42–55)
PH BLDV: <6.9 — CRITICAL LOW (ref 7.32–7.43)
PLATELET # BLD AUTO: 233 K/UL — SIGNIFICANT CHANGE UP (ref 150–400)
PO2 BLDV: 12 MMHG — LOW (ref 25–45)
POTASSIUM BLDV-SCNC: 6.1 MMOL/L — HIGH (ref 3.5–5.1)
POTASSIUM SERPL-MCNC: 4.7 MMOL/L — SIGNIFICANT CHANGE UP (ref 3.5–5.3)
POTASSIUM SERPL-SCNC: 4.7 MMOL/L — SIGNIFICANT CHANGE UP (ref 3.5–5.3)
PROT SERPL-MCNC: 7.4 G/DL — SIGNIFICANT CHANGE UP (ref 6–8.3)
PROTHROM AB SERPL-ACNC: 11.9 SEC — SIGNIFICANT CHANGE UP (ref 10.5–13.4)
RBC # BLD: 5.49 M/UL — SIGNIFICANT CHANGE UP (ref 4.2–5.8)
RBC # FLD: 13.5 % — SIGNIFICANT CHANGE UP (ref 10.3–14.5)
SAO2 % BLDV: 13.7 % — LOW (ref 67–88)
SODIUM SERPL-SCNC: 139 MMOL/L — SIGNIFICANT CHANGE UP (ref 135–145)
WBC # BLD: 9.3 K/UL — SIGNIFICANT CHANGE UP (ref 3.8–10.5)
WBC # FLD AUTO: 9.3 K/UL — SIGNIFICANT CHANGE UP (ref 3.8–10.5)

## 2022-10-23 PROCEDURE — 54220 IRRG CRPRA CAVRNOSA PRIAPISM: CPT

## 2022-10-23 PROCEDURE — 82435 ASSAY OF BLOOD CHLORIDE: CPT

## 2022-10-23 PROCEDURE — 96375 TX/PRO/DX INJ NEW DRUG ADDON: CPT | Mod: XU

## 2022-10-23 PROCEDURE — 85014 HEMATOCRIT: CPT

## 2022-10-23 PROCEDURE — 96374 THER/PROPH/DIAG INJ IV PUSH: CPT | Mod: XU

## 2022-10-23 PROCEDURE — 36415 COLL VENOUS BLD VENIPUNCTURE: CPT

## 2022-10-23 PROCEDURE — 85018 HEMOGLOBIN: CPT

## 2022-10-23 PROCEDURE — 83605 ASSAY OF LACTIC ACID: CPT

## 2022-10-23 PROCEDURE — 54235 NJX CORPORA CAVERNOSA RX AGT: CPT

## 2022-10-23 PROCEDURE — 80053 COMPREHEN METABOLIC PANEL: CPT

## 2022-10-23 PROCEDURE — 83735 ASSAY OF MAGNESIUM: CPT

## 2022-10-23 PROCEDURE — 99291 CRITICAL CARE FIRST HOUR: CPT

## 2022-10-23 PROCEDURE — 84132 ASSAY OF SERUM POTASSIUM: CPT

## 2022-10-23 PROCEDURE — 99283 EMERGENCY DEPT VISIT LOW MDM: CPT | Mod: 25

## 2022-10-23 PROCEDURE — 82803 BLOOD GASES ANY COMBINATION: CPT

## 2022-10-23 PROCEDURE — 99291 CRITICAL CARE FIRST HOUR: CPT | Mod: 25

## 2022-10-23 PROCEDURE — 85610 PROTHROMBIN TIME: CPT

## 2022-10-23 PROCEDURE — 64450 NJX AA&/STRD OTHER PN/BRANCH: CPT | Mod: XU

## 2022-10-23 PROCEDURE — 85730 THROMBOPLASTIN TIME PARTIAL: CPT

## 2022-10-23 PROCEDURE — 82330 ASSAY OF CALCIUM: CPT

## 2022-10-23 PROCEDURE — 85025 COMPLETE CBC W/AUTO DIFF WBC: CPT

## 2022-10-23 PROCEDURE — 84295 ASSAY OF SERUM SODIUM: CPT

## 2022-10-23 PROCEDURE — 82947 ASSAY GLUCOSE BLOOD QUANT: CPT

## 2022-10-23 RX ORDER — OXYCODONE HYDROCHLORIDE 5 MG/1
5 TABLET ORAL ONCE
Refills: 0 | Status: DISCONTINUED | OUTPATIENT
Start: 2022-10-23 | End: 2022-10-23

## 2022-10-23 RX ORDER — PSEUDOEPHEDRINE HCL 30 MG
120 TABLET ORAL ONCE
Refills: 0 | Status: COMPLETED | OUTPATIENT
Start: 2022-10-23 | End: 2022-10-23

## 2022-10-23 RX ORDER — ACETAMINOPHEN 500 MG
1000 TABLET ORAL ONCE
Refills: 0 | Status: COMPLETED | OUTPATIENT
Start: 2022-10-23 | End: 2022-10-23

## 2022-10-23 RX ORDER — PHENYLEPHRINE HYDROCHLORIDE 10 MG/ML
1000 INJECTION INTRAVENOUS ONCE
Refills: 0 | Status: DISCONTINUED | OUTPATIENT
Start: 2022-10-23 | End: 2022-10-27

## 2022-10-23 RX ADMIN — Medication 1000 MILLIGRAM(S): at 19:00

## 2022-10-23 RX ADMIN — Medication 1 MILLIGRAM(S): at 19:13

## 2022-10-23 RX ADMIN — Medication 400 MILLIGRAM(S): at 18:44

## 2022-10-23 RX ADMIN — OXYCODONE HYDROCHLORIDE 5 MILLIGRAM(S): 5 TABLET ORAL at 19:00

## 2022-10-23 RX ADMIN — OXYCODONE HYDROCHLORIDE 5 MILLIGRAM(S): 5 TABLET ORAL at 17:37

## 2022-10-23 RX ADMIN — Medication 120 MILLIGRAM(S): at 21:14

## 2022-10-23 NOTE — CONSULT NOTE ADULT - ASSESSMENT
A/P: 38 yo M with PMHx of depression/anxiety on Luvox and recurrent priapism presents to ED with c/o of painful erection for roughly the past 8.5 hours consistent with priapism. Patient successfully detumesced s/p aspiration and phenylephrine injection. Patient subsequently re-evaluated ~8:00pm, phallus still remaining detumescent. Dry sterile dressing applied with light compression.     Recs:  - Give Sudafed 4 tabs now  - s/p Aspiration and phenylephrine    - light pressure dressing applied to penis can remain for 48-72 hours  - discharge with pain medication prn  - return to ER precautions if priapism >4 hrs  - follow up with Dr. Joe Dominguez, [facility]   - d/w Dr. Griffin    [facility]  [address] [place] [address] [phone]

## 2022-10-23 NOTE — ED PROVIDER NOTE - NS ED ROS FT
Constitutional:  (-) fever, (-) chills, (-) lethargy  Cardiac: (-) chest pain   Respiratory:  (-) cough   GI:  (-) nausea (-) vomiting (-) diarrhea (-) abdominal pain.  :  (-) dysuria (-) frequency (-) burning.  MS:  (-) back pain (-) joint pain.  Neuro:  (-) numbness (-) tingling   Skin:  (-) rash  Except as documented in the HPI,  all other systems are negative

## 2022-10-23 NOTE — ED PROVIDER NOTE - OBJECTIVE STATEMENT
37 yom hx of depression was on trazadone in past no longer on this med for years, p/w priapism since 10 AM this morning. The priapism is painful. notices that the glans becomes flaccid after a time while shaft remains rigid. took no meds for this. Squats usually help and running but did not this time. No hx of sickle cell, no other med hx. No hx of STIs, freq razor bumps d/t pt shaving. No testicular pain or swelling.     The pt is a frequent ED pt requiring detumescence using phenyl and aspiration.

## 2022-10-23 NOTE — ED PROVIDER NOTE - PROGRESS NOTE DETAILS
Urology consult - pt tolerated detumescence w/ aspiration and phenylephrine. VSS, HDS. Pt feels well. Waiting for uro recs. D/c pending

## 2022-10-23 NOTE — ED PROVIDER NOTE - NSFOLLOWUPINSTRUCTIONS_ED_ALL_ED_FT
- You came to the emergency room for priapism.  - We did: lessening of your erection w/ drainage and meds  - Your testing/exams was/were reassuring that dangerous emergencies/conditions are less likely to be occurring or to have occurred.  - You were diagnosed with: priapism    - light pressure dressing applied to penis can remain for 48-72 hours  - return to ER precautions if priapism >4 hrs  - follow up with Dr. Joe Dominguez, [facility]       - Take all medications as directed.    - For pain or fever you can take ibuprofen (motrin, advil) or acetaminophen (tylenol) as needed, as directed on packaging.  - Follow up with your primary doctor within 5 days as directed.  - If you had labs or imaging done, you were given copies of all labs and/or imaging results from your er visit--please take them with you to your follow up appointments.  - If needed, call patient access services at [phone] to find a primary care physician (PCP). Call this number to follow up with a specialty service, such as the spine clinic. If you need this, call and say you were recently in the emergency department and you are calling, per my orders.   - Make sure you do not require a primary care physician's referral if you make a specialty clinic appointment directly. Some insurance requires you to see your PCP, get a referral, then make a specialty appointment.   - Return to the emergency department for any worsening symptoms or concerns as stated above. See Urology (Dr Joe Dominguez) this week for follow up -- call to discuss.    Keep light pressure dressing applied to penis can remain for 48-72 hours.    Use Acetaminophen and/or Ibuprofen as directed for pain -- see medication warnings.    See PRIAPISM information and return instructions given to you.    Seek immediate medical care for new/worsening symptoms/concerns.

## 2022-10-23 NOTE — ED PROVIDER NOTE - CLINICAL SUMMARY MEDICAL DECISION MAKING FREE TEXT BOX
see MD Note 37 yom w/ hx of priapism p/w w/ dull ache painful priapism w/ flaccid glans and normally reduced w/ exercise or 40 of phenylephrine this morning. Able to urinate w/o issue    dx includes low vs fast flow priapism vs penile fx     likely low flow given flaccid glans and dull pain. Will numb w/ penile block, consult uro for drainage, med for pain        likelty d/c    see MD Note

## 2022-10-23 NOTE — ED ADULT NURSE NOTE - OBJECTIVE STATEMENT
pt states, "I this morning I woke around 0630 with an erection and I got up did some things and it went away. I went back to bed and around 0900 I woke up and it came back but it would not go down. I was here last week for the same thing" pt denies any lightheadedness, dizziness, chest pain, SOB, abd. pain, n/v/d, numbness/tingling at present. pt able to pass urine at present. pt denies any ED medication usage.

## 2022-10-23 NOTE — ED PROVIDER NOTE - PHYSICAL EXAMINATION
CONSTITUTIONAL: well-appearing, in NAD  SKIN: Warm dry    HEAD: NCAT  EYES: no scleral icterus, conjunctiva pink  NECK: Supple; non tender.   CARD: RRR, no murmurs.  RESP: clear to ausculation b/l. No crackles or wheezing.  ABD: soft, non-tender, non-distended, no rebound or guarding.  : Flaccid glans w/ rigid penile shaft. No d/c noted. Testicles in vertical lay. not tender.  MSK: no pedal edema, no calf tenderness  PSYCH: Cooperative, appropriate.

## 2022-10-23 NOTE — CONSULT NOTE ADULT - SUBJECTIVE AND OBJECTIVE BOX
HPI:  Patient is a 36 yo M with PMHx of depression on Luvox and recurrent priapism presents to ED with c/o of painful erection for roughly the past 8.5 hours. Pt reports he woke up around 6am with an erection that successfully went down with time, and went back to sleep--then woke up again around 10am with an erection that been present since. States it was unprovoked. Starting to become painful. Pain comes and goes. Still able to void without difficulty. Urine clear yellow. No testicular pain, but did feel a tightness in the scrotum with penile pain. Has had 5 previous priapisms in the past requiring aspiration and phenylephrine for treatment. First time was secondary to Trazodone and testosterone supplementation, the other times could not be linked to any specific cause. Denies fever/chills, N/V, abd pain, urinary retention, dysuria, hematuria, or other acute complaints. No history of sickle cell disease or blood dyscrasias.  In the ER, the priapism was successfully detumesced with drainage and phenylephrine.      PAST MEDICAL & SURGICAL HISTORY:  OCD (obsessive compulsive disorder)      Priapism      Elbow injury  (R)      Anxiety      No significant past surgical history        FAMILY HISTORY:  FH: diabetes mellitus  father      SOCIAL HISTORY:   Tobacco hx:  MEDICATIONS  (STANDING):  phenylephrine   100 mCg/mL NaCl 0.9% Injectable 1000 MICROGram(s) IntraCavernosal Once    MEDICATIONS  (PRN):    Allergies    enviornmental allergies (Sneezing)  No Known Drug Allergies    Intolerances        REVIEW OF SYSTEMS: Pertinent positives and negatives as stated in HPI, otherwise negative    Vital signs  T(C): 36.7 (10-23-22 @ 19:15), Max: 36.7 (10-23-22 @ 17:06)  HR: 66 (10-23-22 @ 19:15)  BP: 101/72 (10-23-22 @ 19:15)  SpO2: 99% (10-23-22 @ 19:15)  Wt(kg): --    Output        Physical Exam  Gen: NAD  Pulm: No respiratory distress, no subcostal retractions  Abd: Soft, NT, ND  : penis erect upon initial exam, unable to bend; scrotum/testes unremarkable; s/p aspiration and phenylephrine injection. see procedure note for more details  MSK: No edema present    LABS:        10-23 @ 17:50    WBC 9.30  / Hct 45.3  / SCr 1.15     10-23    139  |  104  |  18  ----------------------------<  89  4.7   |  24  |  1.15    Ca    9.8      23 Oct 2022 17:50  Mg     1.5     10-23    TPro  7.4  /  Alb  4.5  /  TBili  0.5  /  DBili  x   /  AST  61<H>  /  ALT  73<H>  /  AlkPhos  52  10-23    PT/INR - ( 23 Oct 2022 17:50 )   PT: 11.9 sec;   INR: 1.03 ratio         PTT - ( 23 Oct 2022 17:50 )  PTT:25.5 sec

## 2022-10-23 NOTE — ED PROCEDURE NOTE - PROCEDURE ADDITIONAL DETAILS
Priapism, 10+ hrs painful   - Dorsal Nerve Block (sterile, by landmarks, 6 ml Lidocaine 1%)   - aspiration blood Corpus Cavernosum, sterile by landmarks, 18 gauge, 20 ml   - Urology arrived at bedside and procedure handed over to them.   - no complicatons    Yaw Bliss MD

## 2022-10-23 NOTE — PROCEDURE NOTE - GENERAL PROCEDURE DETAILS
Penile corpora was aspirated with an 18 gauge needle and 6-7cc of phenylephrine was injected into the corpora for ultimate detumescence.

## 2022-10-23 NOTE — ED PROVIDER NOTE - CARE PROVIDER_API CALL
Joe Dominguez; PhD)  Urology  [address], [address]  [place] [address]  Phone: [phone]  Fax: [phone]  Follow Up Time: Urgent

## 2022-10-23 NOTE — ED PROVIDER NOTE - ATTENDING CONTRIBUTION TO CARE
------------ATTENDING NOTE------------  pt c/o 10 hrs of constant moderate dull ache/throbbing painful erection, similar to past priapisms, no drug/intoxicant use, took oral 40 mg Phenylephrine at home 6 hrs ago w/o improvement, labs sent, pending Urology consult and tx/dispo -->  - Yaw Bliss MD   --------------------------------------------- ------------ATTENDING NOTE------------  pt c/o 10 hrs of constant moderate dull ache/throbbing painful erection, similar to past priapisms, no drug/intoxicant use, took oral 40 mg Phenylephrine at home 6 hrs ago w/o improvement, able to urinate, labs sent, pending Urology consult and tx/dispo -->  - Yaw Bliss MD   --------------------------------------------- ------------ATTENDING NOTE------------  pt c/o 10 hrs of constant moderate dull ache/throbbing painful erection, similar to past priapisms, no drug/intoxicant use, took oral 40 mg Phenylephrine at home 6 hrs ago w/o improvement, able to urinate, labs sent, pending Urology consult and tx/dispo --> easily resolved, observed w/o concerns, cleared by Urology, nml VS at [place], in depth dw pt about ddx, tx, prakash, continued close outpt fu.  - Yaw Bliss MD   ---------------------------------------------

## 2022-10-23 NOTE — ED PROVIDER NOTE - PATIENT PORTAL LINK FT
You can access the FollowMyHealth Patient Portal offered by [facility] by registering at the following website: [email or web address]. By joining [employer]’s FollowMyHealth portal, you will also be able to view your health information using other applications (apps) compatible with our system.

## 2022-12-12 ENCOUNTER — APPOINTMENT (OUTPATIENT)
Dept: UROLOGY | Facility: CLINIC | Age: 37
End: 2022-12-12

## 2022-12-13 ENCOUNTER — APPOINTMENT (OUTPATIENT)
Dept: PSYCHIATRY | Facility: CLINIC | Age: 37
End: 2022-12-13

## 2022-12-13 PROCEDURE — 99214 OFFICE O/P EST MOD 30 MIN: CPT | Mod: 95

## 2022-12-14 RX ORDER — CLONAZEPAM 1 MG/1
1 TABLET ORAL
Qty: 30 | Refills: 0 | Status: DISCONTINUED | COMMUNITY
Start: 2019-11-07 | End: 2022-12-14

## 2022-12-14 NOTE — FAMILY HISTORY
[FreeTextEntry1] : Patient born in [facility].  Mother 63 retired paraprofessional in the school system.  Suffers from anxiety.  Father  of a heart attack he also had anxiety.  He has a half-brother and half-sister both in their 50s.  The sister suffers from anxiety and OCD.  There is a maternal grandmother with a psychiatric history.  No drug or alcohol history in the family.

## 2022-12-14 NOTE — DISCUSSION/SUMMARY
[FreeTextEntry1] : 37-year-old male with obsessive thinking anxiety panic.  Plan to taper and discontinue Klonopin.  Continue Luvox 200 mg daily.  Follow-up in 3 months.

## 2022-12-14 NOTE — CURRENT PSYCHIATRIC SYMPTOMS
[Depressed Mood] : no depressed mood [Insomnia] : no insomnia disorder [Excessive Worry] : no excessive worries [Ruminations] : no rumination disorder [Obsessions] : no obsessions [Panic] : no panic disorder [de-identified] : Denied [de-identified] : Denied [de-identified] : Denied [de-identified] : Denied [de-identified] : None [de-identified] : None [de-identified] : None

## 2022-12-14 NOTE — PHYSICAL EXAM
[None] : none [Normal] : normal [Fair] : fair [Obsessions] : no obsessions [Anxious] : no anxious [Dysphoric] : not dysphoric

## 2022-12-14 NOTE — PAST MEDICAL HISTORY
[FreeTextEntry1] : When the patient was 17 years of age she was placed on Remeron and also tried on gabapentin.  Past medications have included Wellbutrin and Paxil.  Paxil was helpful.  He was also tried on Lexapro which was helpful but sweated a lot.  He was tried on Luvox which also caused sweating.  He was on trazodone which caused priapism.  He had been taking BuSpar and gabapentin but stopped.  In the past he was also on risperidone.
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
## 2022-12-14 NOTE — SOCIAL HISTORY
[FreeTextEntry1] : Patient grew up in [place].  He went to [employer].  He graduated in 2004.  High school was okay he had a few friends he was very shy and socially introverted.  Right after school he came home.  He did okay in classes.  He then started working after high school and tried to go to college for 1 semester but left.  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

## 2023-03-06 ENCOUNTER — APPOINTMENT (OUTPATIENT)
Dept: PULMONOLOGY | Facility: CLINIC | Age: 38
End: 2023-03-06
Payer: COMMERCIAL

## 2023-03-06 VITALS
BODY MASS INDEX: 29.05 KG/M2 | SYSTOLIC BLOOD PRESSURE: 102 MMHG | OXYGEN SATURATION: 97 % | DIASTOLIC BLOOD PRESSURE: 51 MMHG | WEIGHT: 180 LBS | HEART RATE: 76 BPM

## 2023-03-06 DIAGNOSIS — M54.2 CERVICALGIA: ICD-10-CM

## 2023-03-06 DIAGNOSIS — G47.33 OBSTRUCTIVE SLEEP APNEA (ADULT) (PEDIATRIC): ICD-10-CM

## 2023-03-06 PROCEDURE — 99203 OFFICE O/P NEW LOW 30 MIN: CPT

## 2023-03-06 RX ORDER — PENTOXIFYLLINE 400 MG/1
400 TABLET, EXTENDED RELEASE ORAL 3 TIMES DAILY
Qty: 90 | Refills: 2 | Status: DISCONTINUED | COMMUNITY
Start: 2019-10-03 | End: 2023-03-06

## 2023-03-06 NOTE — REASON FOR VISIT
[Initial] : an initial visit [Initial Evaluation] : an initial evaluation [Sleep Apnea] : sleep apnea [Sleep Evaluation] : sleep evaluation

## 2023-03-06 NOTE — REVIEW OF SYSTEMS
[Snoring] : snoring [A.M. Dry Mouth] : a.m. dry mouth [A.M. Headache] : headache present upon awakening [Negative] : Psychiatric

## 2023-03-06 NOTE — PHYSICAL EXAM
[General Appearance - Well Developed] : well developed [Well Groomed] : well groomed [General Appearance - Well Nourished] : well nourished [No Deformities] : no deformities [General Appearance - In No Acute Distress] : no acute distress [Normal Conjunctiva] : the conjunctiva exhibited no abnormalities [Eyelids - No Xanthelasma] : the eyelids demonstrated no xanthelasmas [Heart Rate And Rhythm] : heart rate was normal and rhythm regular [Heart Sounds] : normal S1 and S2 [Heart Sounds Gallop] : no gallops [Murmurs] : no murmurs [Heart Sounds Pericardial Friction Rub] : no pericardial rub [] : no respiratory distress [Auscultation Breath Sounds / Voice Sounds] : lungs were clear to auscultation bilaterally [No Acute Distress] : no acute distress [Normal Oropharynx] : normal oropharynx [Normal Appearance] : normal appearance [No Neck Mass] : no neck mass [Normal Rate/Rhythm] : normal rate/rhythm [Normal S1, S2] : normal s1, s2 [No Murmurs] : no murmurs [No Resp Distress] : no resp distress [Clear to Auscultation Bilaterally] : clear to auscultation bilaterally [No Abnormalities] : no abnormalities [Benign] : benign [Normal Gait] : normal gait [No Clubbing] : no clubbing [No Cyanosis] : no cyanosis [No Edema] : no edema [FROM] : FROM [Normal Color/ Pigmentation] : normal color/ pigmentation [No Focal Deficits] : no focal deficits [Oriented x3] : oriented x3 [Normal Affect] : normal affect

## 2023-03-09 NOTE — ASSESSMENT
[FreeTextEntry1] : Based on history and physical exam the patient has a high likelihood of having obstructive sleep apnea. Further assessment by sleep testing is recommended. There is no contraindication to a home sleep study. We will therefore proceed to two night home apnea sleep study for further assessment.

## 2023-03-09 NOTE — ADDENDUM
[FreeTextEntry1] : I, Justa Leivafranc, acted solely as a scribe for Dr. Marshall Will M.D. on this date 03/06/2023. \par \par All medical record entries made by the Scribe were at my, Dr. Marshall Will M.D., direction and personally dictated by me on 03/06/2023. I have reviewed the chart and agree that the record accurately reflects my personal performance of the history, physical exam, assessment and plan. I have also personally directed, reviewed, and agreed with the chart.

## 2023-03-09 NOTE — HISTORY OF PRESENT ILLNESS
[Insomnia] : insomnia [Awakes with Dry Mouth] : awakes with dry mouth [Awakes with Headache] : awakes with headache [Recent  Weight Gain] : recent weight gain [Snoring] : snoring [Never] : never [TextBox_4] : Referred for sleep evaluation.  Chronic pain and anxiety disrupting sleep.  Reports chronic poor sleep.  However also notes snoring and nonrestorative sleep.  He reports dry mouth in the morning and morning headaches [FreeTextEntry1] : MATEUS DOMINGO is a 38 year male who presents to the office for initial evaluation of sleep apnea. Patient reports of having chronic sleep issues due pain which leads to anxiety. He also states of having symptoms of snoring, morning dry mouth and headache, recent weight gain. He denies being on any medications for his pain. Patient denies of any drug related allergies.

## 2023-06-19 ENCOUNTER — APPOINTMENT (OUTPATIENT)
Dept: PSYCHIATRY | Facility: CLINIC | Age: 38
End: 2023-06-19
Payer: COMMERCIAL

## 2023-06-19 DIAGNOSIS — F41.0 PANIC DISORDER [EPISODIC PAROXYSMAL ANXIETY]: ICD-10-CM

## 2023-06-19 DIAGNOSIS — F42.8 OTHER OBSESSIVE COMPULSIVE DISORDER: ICD-10-CM

## 2023-06-19 DIAGNOSIS — F40.10 SOCIAL PHOBIA, UNSPECIFIED: ICD-10-CM

## 2023-06-19 DIAGNOSIS — G47.00 INSOMNIA, UNSPECIFIED: ICD-10-CM

## 2023-06-19 DIAGNOSIS — F41.9 ANXIETY DISORDER, UNSPECIFIED: ICD-10-CM

## 2023-06-19 PROCEDURE — 99214 OFFICE O/P EST MOD 30 MIN: CPT | Mod: 95

## 2023-06-19 RX ORDER — HYDROXYZINE HYDROCHLORIDE 25 MG/1
25 TABLET ORAL 3 TIMES DAILY
Qty: 90 | Refills: 1 | Status: ACTIVE | COMMUNITY
Start: 2020-07-27 | End: 1900-01-01

## 2023-06-19 RX ORDER — FLUVOXAMINE MALEATE 50 MG/1
50 TABLET ORAL DAILY
Qty: 180 | Refills: 0 | Status: ACTIVE | COMMUNITY
Start: 2020-11-20 | End: 1900-01-01

## 2023-06-19 NOTE — SOCIAL HISTORY
[FreeTextEntry1] : Patient grew up in [place].  He went to [employer].  He graduated in 2004.  High school was okay he had a few friends he was very shy and socially introverted.  Right after school he came home.  He did okay in classes.  He then started working after high school and tried to go to college for 1 semester but left.  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

## 2023-06-19 NOTE — PHYSICAL EXAM
[None] : none [Obsessions] : no obsessions [Anxious] : no anxious [Dysphoric] : not dysphoric [Normal] : normal [Fair] : fair

## 2023-06-19 NOTE — CURRENT PSYCHIATRIC SYMPTOMS
[Depressed Mood] : no depressed mood [Insomnia] : no insomnia disorder [Excessive Worry] : no excessive worries [Ruminations] : no rumination disorder [Obsessions] : no obsessions [Panic] : no panic disorder [de-identified] : Denied [de-identified] : Denied [de-identified] : Denied [de-identified] : Denied [de-identified] : None [de-identified] : None [de-identified] : None
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
## 2023-06-19 NOTE — DISCUSSION/SUMMARY
[FreeTextEntry1] : 38-year-old male with obsessive thinking anxiety panic.  Plan continue Luvox 200 mg daily.  Melatonin at night for sleep.  Follow-up in 4 weeks.

## 2023-06-19 NOTE — FAMILY HISTORY
[FreeTextEntry1] : Patient born in [facility].  Mother 63 retired paraprofessional in the school system.  Suffers from anxiety.  Father  of a heart attack he also had anxiety.  He has a half-brother and half-sister both in their 50s.  The sister suffers from anxiety and OCD.  There is a maternal grandmother with a psychiatric history.  No drug or alcohol history in the family.

## 2023-07-27 NOTE — ASU PREOP CHECKLIST - BMI (KG/M2)
Please call your doctor if you have fever or chills within the next 48 hours. 26.9 no deformity, pain or tenderness. no restriction of movement

## 2023-11-24 ENCOUNTER — NON-APPOINTMENT (OUTPATIENT)
Age: 38
End: 2023-11-24

## 2024-01-18 NOTE — ED PROVIDER NOTE - CARDIOVASCULAR NEGATIVE STATEMENT, MLM
Progress Note - Geriatric Medicine   Tracy Rock 82 y.o. female MRN: [id number]  Unit/Bed#: -01 Encounter: [id number]      Assessment/Plan:  Polycythemia  Assessment & Plan  Hemoglobin 17.1  Monitor CBC  CT chest abdomen and pelvis were ordered-will follow-up with results  Consider hematology consult    Encephalopathy  Assessment & Plan  Patient presented to the hospital with hypoactive delirium.  Per nursing staff she was agitated intermittently during her hospital stay and she required physical restraint.\  Today at the time of encounter patient is very somnolent    -Patient is high risk of delirium due to dementia at baseline, change in environment, cellulitis and influenza A infection  - Tamiflu could have been contributing to her change in mental status  -continue delirium precautions  -maintain normal sleep/wake cycle-hold melatonin  -minimize overnight interruptions, group overnight vitals/labs/nursing checks as possible  -dim lights, close blinds and turn off tv to minimize stimulation and encourage sleep environment in evenings  -ensure that pain is well controlled continue Tylenol 975mg Q8H scheduled   -monitor for fecal and urinary retention which may precipitate delirium  -encourage early mobilization and ambulation  -provide frequent reorientation and redirection  -encourage family and friends at the bedside to help calm patient if anxious  -avoid medications which may precipitate or worsen delirium such as tramadol, benzodiazepine, anticholinergics, and antihistaminics  -encourage hydration and nutrition , assist with feeding if needed  -redirect unwanted behaviors as first line, avoid physical restraints.   -Continue aspiration precautions,  speech therapy follows  -disContinue Lexapro and Zyprexa as well as gabapentin.    -CT head ordered-will follow-up with results      Influenza A  Assessment & Plan  Completed Tamiflu-which could have contributed to her acute encephalopathy  Patient  saturates well on room air  Continue supportive care    Ambulatory dysfunction  Assessment & Plan  Patient uses a walker for ambulation at baseline  Monitor orthostatic vital signs  Encourage p.o. hydration  Avoid hypotension and hypoglycemia   Continue PT OT    Dementia ([facility])  Assessment & Plan  Severe dementia with behavioral disturbance at baseline  Patient needs assistance with all IADLs and most of her ADLs  Will continue to provide supportive care, reorient as needed.  Patient is at high risk for delirium, will monitor closely and place on delirium precautions.  Maintain sleep/wake cycle.  Optimize pain regimen.  Monitor for constipation and urinary retention and manage as needed.  B12 level and TSH within normal limits  Encourage family to visit.  Encourage to wear glasses and hearing aids while awake.  Encourage po intake, assist with feeding if needed.   Discontinue Lexapro gabapentin and Zyprexa at this time due to hypoactive delirium  Hold melatonin 3 mg nightly    Atrial fibrillation ([facility])  Assessment & Plan  Currently rate controlled  Continue metoprolol and anticoagulation with Eliquis  Monitor electrolytes    Type 2 diabetes mellitus ([facility])  Assessment & Plan  Lab Results   Component Value Date    HGBA1C 7.6 (H) 01/17/2024       Recent Labs     01/17/24  2042 01/18/24  0712 01/18/24  1102 01/18/24  1610   POCGLU 108 101 133 163*       Blood Sugar Average: Last 72 hrs:  (P) [id number]      Avoid hypoglycemia  Goal for hemoglobin A1c for patient age and comorbidities will be 8.5-9.0    * Sepsis ([facility])  Assessment & Plan  Patient met the sepsis criteria on admission to the hospital  Vitals signs are stable  Completed antibiotic treatment  for right lower extremity cellulitis       Subjective:   Patient seen and examined at bedside for geriatric follow-up.  At time of encounter patient is somnolent not able to provide any history.  Per nursing staff patient was somnolent throughout the day, had  "minimal p.o. intake, she was able to take her medications    Review of Systems   Unable to perform ROS: Mental status change         Objective:     Vitals: Blood pressure 169/94, pulse 79, temperature 98.2 °F (36.8 °C), temperature source Tympanic, resp. rate (!) 26, height 5' 7\" (1.702 m), weight 92 kg (202 lb 13.2 oz), SpO2 94%.,Body mass index is 31.77 kg/m².      Intake/Output Summary (Last 24 hours) at 1/18/2024 1638  Last data filed at 1/18/2024 1100  Gross per 24 hour   Intake 1860 ml   Output --   Net 1860 ml       Current Medications: Reviewed    Physical Exam:   Physical Exam  Vitals and nursing note reviewed.   Constitutional:       General: She is not in acute distress.     Appearance: She is well-developed. She is obese.   HENT:      Head: Normocephalic and atraumatic.      Mouth/Throat:      Mouth: Mucous membranes are dry.   Eyes:      Conjunctiva/sclera: Conjunctivae normal.   Cardiovascular:      Rate and Rhythm: Normal rate and regular rhythm.      Heart sounds: Heart sounds are distant. No murmur heard.  Pulmonary:      Effort: Pulmonary effort is normal. No respiratory distress.      Comments: Anterior chest congested  Abdominal:      Palpations: Abdomen is soft.      Tenderness: There is no abdominal tenderness.   Musculoskeletal:         General: No swelling.      Cervical back: Neck supple.      Right lower leg: Edema present.      Left lower leg: Edema present.   Skin:     General: Skin is warm and dry.      Capillary Refill: Capillary refill takes less than 2 seconds.   Neurological:      Mental Status: She is alert.      Comments: Somnolent, able to get aroused with verbal and tactile stimuli however not able to answer any questions  Spasticity noted upper extremities   Psychiatric:         Mood and Affect: Mood normal.          Invasive Devices       Peripheral Intravenous Line  Duration             Peripheral IV 01/18/24 Right;Ventral (anterior) Forearm <1 day                    Lab, " Imaging and other studies: I have personally reviewed pertinent reports.      normal rate and rhythm, no chest pain and no edema.

## 2024-04-10 ENCOUNTER — EMERGENCY (EMERGENCY)
Facility: HOSPITAL | Age: 39
LOS: 1 days | Discharge: HOME | End: 2024-04-10
Attending: EMERGENCY MEDICINE
Payer: COMMERCIAL

## 2024-04-10 VITALS
WEIGHT: 179.9 LBS | DIASTOLIC BLOOD PRESSURE: 90 MMHG | TEMPERATURE: 98 F | OXYGEN SATURATION: 98 % | RESPIRATION RATE: 20 BRPM | SYSTOLIC BLOOD PRESSURE: 144 MMHG | HEIGHT: 66 IN | HEART RATE: 73 BPM

## 2024-04-10 VITALS
RESPIRATION RATE: 16 BRPM | OXYGEN SATURATION: 99 % | TEMPERATURE: 98 F | SYSTOLIC BLOOD PRESSURE: 113 MMHG | HEART RATE: 75 BPM | DIASTOLIC BLOOD PRESSURE: 67 MMHG

## 2024-04-10 LAB
ALBUMIN SERPL ELPH-MCNC: 4.3 G/DL — SIGNIFICANT CHANGE UP (ref 3.3–5)
ALP SERPL-CCNC: 61 U/L — SIGNIFICANT CHANGE UP (ref 40–120)
ALT FLD-CCNC: 37 U/L — SIGNIFICANT CHANGE UP (ref 10–45)
ANION GAP SERPL CALC-SCNC: 12 MMOL/L — SIGNIFICANT CHANGE UP (ref 5–17)
AST SERPL-CCNC: 38 U/L — SIGNIFICANT CHANGE UP (ref 10–40)
BASOPHILS # BLD AUTO: 0.05 K/UL — SIGNIFICANT CHANGE UP (ref 0–0.2)
BASOPHILS NFR BLD AUTO: 0.6 % — SIGNIFICANT CHANGE UP (ref 0–2)
BILIRUB SERPL-MCNC: 0.4 MG/DL — SIGNIFICANT CHANGE UP (ref 0.2–1.2)
BUN SERPL-MCNC: 20 MG/DL — SIGNIFICANT CHANGE UP (ref 7–23)
CALCIUM SERPL-MCNC: 9.9 MG/DL — SIGNIFICANT CHANGE UP (ref 8.4–10.5)
CHLORIDE SERPL-SCNC: 105 MMOL/L — SIGNIFICANT CHANGE UP (ref 96–108)
CO2 SERPL-SCNC: 22 MMOL/L — SIGNIFICANT CHANGE UP (ref 22–31)
CREAT SERPL-MCNC: 1.16 MG/DL — SIGNIFICANT CHANGE UP (ref 0.5–1.3)
EGFR: 83 ML/MIN/1.73M2 — SIGNIFICANT CHANGE UP
EOSINOPHIL # BLD AUTO: 0.04 K/UL — SIGNIFICANT CHANGE UP (ref 0–0.5)
EOSINOPHIL NFR BLD AUTO: 0.5 % — SIGNIFICANT CHANGE UP (ref 0–6)
GLUCOSE SERPL-MCNC: 106 MG/DL — HIGH (ref 70–99)
HCT VFR BLD CALC: 48.2 % — SIGNIFICANT CHANGE UP (ref 39–50)
HGB BLD-MCNC: 15.7 G/DL — SIGNIFICANT CHANGE UP (ref 13–17)
IMM GRANULOCYTES NFR BLD AUTO: 0.2 % — SIGNIFICANT CHANGE UP (ref 0–0.9)
LYMPHOCYTES # BLD AUTO: 1.63 K/UL — SIGNIFICANT CHANGE UP (ref 1–3.3)
LYMPHOCYTES # BLD AUTO: 19.1 % — SIGNIFICANT CHANGE UP (ref 13–44)
MCHC RBC-ENTMCNC: 26.6 PG — LOW (ref 27–34)
MCHC RBC-ENTMCNC: 32.6 GM/DL — SIGNIFICANT CHANGE UP (ref 32–36)
MCV RBC AUTO: 81.6 FL — SIGNIFICANT CHANGE UP (ref 80–100)
MONOCYTES # BLD AUTO: 0.47 K/UL — SIGNIFICANT CHANGE UP (ref 0–0.9)
MONOCYTES NFR BLD AUTO: 5.5 % — SIGNIFICANT CHANGE UP (ref 2–14)
NEUTROPHILS # BLD AUTO: 6.31 K/UL — SIGNIFICANT CHANGE UP (ref 1.8–7.4)
NEUTROPHILS NFR BLD AUTO: 74.1 % — SIGNIFICANT CHANGE UP (ref 43–77)
NRBC # BLD: 0 /100 WBCS — SIGNIFICANT CHANGE UP (ref 0–0)
PLATELET # BLD AUTO: 245 K/UL — SIGNIFICANT CHANGE UP (ref 150–400)
POTASSIUM SERPL-MCNC: 4.3 MMOL/L — SIGNIFICANT CHANGE UP (ref 3.5–5.3)
POTASSIUM SERPL-SCNC: 4.3 MMOL/L — SIGNIFICANT CHANGE UP (ref 3.5–5.3)
PROT SERPL-MCNC: 7.2 G/DL — SIGNIFICANT CHANGE UP (ref 6–8.3)
RBC # BLD: 5.91 M/UL — HIGH (ref 4.2–5.8)
RBC # FLD: 12.7 % — SIGNIFICANT CHANGE UP (ref 10.3–14.5)
SODIUM SERPL-SCNC: 139 MMOL/L — SIGNIFICANT CHANGE UP (ref 135–145)
WBC # BLD: 8.52 K/UL — SIGNIFICANT CHANGE UP (ref 3.8–10.5)
WBC # FLD AUTO: 8.52 K/UL — SIGNIFICANT CHANGE UP (ref 3.8–10.5)

## 2024-04-10 PROCEDURE — 80053 COMPREHEN METABOLIC PANEL: CPT

## 2024-04-10 PROCEDURE — 96374 THER/PROPH/DIAG INJ IV PUSH: CPT

## 2024-04-10 PROCEDURE — 99284 EMERGENCY DEPT VISIT MOD MDM: CPT | Mod: 25

## 2024-04-10 PROCEDURE — 54220 IRRG CRPRA CAVRNOSA PRIAPISM: CPT

## 2024-04-10 PROCEDURE — 96375 TX/PRO/DX INJ NEW DRUG ADDON: CPT

## 2024-04-10 PROCEDURE — 99284 EMERGENCY DEPT VISIT MOD MDM: CPT

## 2024-04-10 PROCEDURE — 85025 COMPLETE CBC W/AUTO DIFF WBC: CPT

## 2024-04-10 PROCEDURE — 96376 TX/PRO/DX INJ SAME DRUG ADON: CPT

## 2024-04-10 PROCEDURE — 36415 COLL VENOUS BLD VENIPUNCTURE: CPT

## 2024-04-10 RX ORDER — MORPHINE SULFATE 50 MG/1
4 CAPSULE, EXTENDED RELEASE ORAL ONCE
Refills: 0 | Status: DISCONTINUED | OUTPATIENT
Start: 2024-04-10 | End: 2024-04-10

## 2024-04-10 RX ORDER — ACETAMINOPHEN 500 MG
1000 TABLET ORAL ONCE
Refills: 0 | Status: COMPLETED | OUTPATIENT
Start: 2024-04-10 | End: 2024-04-10

## 2024-04-10 RX ORDER — CEFAZOLIN SODIUM 1 G
1000 VIAL (EA) INJECTION ONCE
Refills: 0 | Status: COMPLETED | OUTPATIENT
Start: 2024-04-10 | End: 2024-04-10

## 2024-04-10 RX ORDER — PHENYLEPHRINE HYDROCHLORIDE 10 MG/ML
1000 INJECTION INTRAVENOUS ONCE
Refills: 0 | Status: COMPLETED | OUTPATIENT
Start: 2024-04-10 | End: 2024-04-10

## 2024-04-10 RX ORDER — PHENYLEPHRINE HYDROCHLORIDE 10 MG/ML
3 INJECTION INTRAVENOUS ONCE
Refills: 0 | Status: DISCONTINUED | OUTPATIENT
Start: 2024-04-10 | End: 2024-04-10

## 2024-04-10 RX ADMIN — MORPHINE SULFATE 4 MILLIGRAM(S): 50 CAPSULE, EXTENDED RELEASE ORAL at 10:47

## 2024-04-10 RX ADMIN — MORPHINE SULFATE 4 MILLIGRAM(S): 50 CAPSULE, EXTENDED RELEASE ORAL at 12:08

## 2024-04-10 RX ADMIN — Medication 100 MILLIGRAM(S): at 14:22

## 2024-04-10 RX ADMIN — PHENYLEPHRINE HYDROCHLORIDE 1000 MICROGRAM(S): 10 INJECTION INTRAVENOUS at 11:39

## 2024-04-10 RX ADMIN — MORPHINE SULFATE 4 MILLIGRAM(S): 50 CAPSULE, EXTENDED RELEASE ORAL at 12:27

## 2024-04-10 RX ADMIN — Medication 400 MILLIGRAM(S): at 12:07

## 2024-04-10 RX ADMIN — Medication 1000 MILLIGRAM(S): at 14:26

## 2024-04-10 RX ADMIN — MORPHINE SULFATE 4 MILLIGRAM(S): 50 CAPSULE, EXTENDED RELEASE ORAL at 14:26

## 2024-04-10 NOTE — ED PROVIDER NOTE - PROGRESS NOTE DETAILS
attending Judy: urology at bedside. pt resting. feeling well. as per pt, erection resolved. re evaluated by Urology and pt can be discharged with close outpt follow up with Dr. Sumanth Griffin, strict return precautions advised; Case discussed with Dr. Kim, tami for d/c -SUMAN Chatterjee

## 2024-04-10 NOTE — ED PROVIDER NOTE - PHYSICAL EXAMINATION
Gen: AAO x 3, mild distress   Skin: No rashes or lesions  HEENT: NC/AT, PERRLA, EOMI, MMM  Resp: unlabored CTAB  Cardiac: rrr s1s2, no murmurs, rubs or gallops  GI: ND, +BS, Soft, NT  : Dr. Kim in the room: +erect penis. no bleeding  Ext: no pedal edema, FROM in all extremities  Neuro: no focal deficits

## 2024-04-10 NOTE — ED PROVIDER NOTE - PATIENT PORTAL LINK FT
You can access the FollowMyHealth Patient Portal offered by [facility] by registering at the following website: [email or web address]. By joining [employer] [facility]’s FollowMyHealth portal, you will also be able to view your health information using other applications (apps) compatible with our system.

## 2024-04-10 NOTE — CONSULT NOTE ADULT - SUBJECTIVE AND OBJECTIVE BOX
HPI:  Patient is a 38 yo M with PMHx of depression and recurrent priapism presents to ED with c/o of painful erection since 4am today.  Still able to void without difficulty. Pt has had numerous priapisms in the past requiring aspiration and phenylephrine for treatment. First time was secondary to Trazodone and testosterone supplementation, the other times could not be linked to any specific cause. Denies fever/chills, N/V, abd pain, urinary retention, dysuria, hematuria, or other acute complaints. No history of sickle cell disease or blood dyscrasias.          PAST MEDICAL & SURGICAL HISTORY:  OCD (obsessive compulsive disorder)      Priapism      Elbow injury  (R)      Anxiety      No significant past surgical history        FAMILY HISTORY:  FH: diabetes mellitus  father     No known  malignancy   SOCIAL HISTORY: Retired   Tobacco hx: smoker/nonsmoker   MEDICATIONS  (STANDING):  phenylephrine   100 mCg/mL NaCl 0.9% Injectable 1000 MICROGram(s) IntraCavernosal once    MEDICATIONS  (PRN):    Allergies    No Known Drug Allergies  enviornmental allergies (Sneezing)    Intolerances        REVIEW OF SYSTEMS: Pertinent positives and negatives as stated in HPI, otherwise negative    Vital signs  T(C): 36.4 (04-10-24 @ 09:57), Max: 36.4 (04-10-24 @ 09:57)  HR: 77 (04-10-24 @ 12:00)  BP: 135/79 (04-10-24 @ 12:00)  SpO2: 100% (04-10-24 @ 12:00)  Wt(kg): --    Physical Exam  Gen: NAD  Abd: Soft, NT, ND, no rebound tenderness or guarding  : + erect penis. unable to bend.   In the ER, the priapism was successfully detumesced with drainage and phenylephrine. (see separate procedure note)  NEURO: A&Ox3,     LABS:      04-10 @ 11:26    WBC 8.52  / Hct 48.2  / SCr 1.16     04-10    139  |  105  |  20  ----------------------------<  106<H>  4.3   |  22  |  1.16    Ca    9.9      10 Apr 2024 11:26    TPro  7.2  /  Alb  4.3  /  TBili  0.4  /  DBili  x   /  AST  38  /  ALT  37  /  AlkPhos  61  04-10      Urinalysis Basic - ( 10 Apr 2024 11:26 )    Color: x / Appearance: x / SG: x / pH: x  Gluc: 106 mg/dL / Ketone: x  / Bili: x / Urobili: x   Blood: x / Protein: x / Nitrite: x   Leuk Esterase: x / RBC: x / WBC x   Sq Epi: x / Non Sq Epi: x / Bacteria: x   [Fatigue] : no fatigue [Fever] : no fever [Chills] : no chills [Poor Appetite] : no poor appetite [Sputum] : no sputum HPI:  Patient is a 40 yo M with PMHx of depression and recurrent priapism presents to ED with c/o of painful erection since 4am today.  Still able to void without difficulty. Pt has had numerous priapisms in the past requiring aspiration and phenylephrine for treatment. First time was secondary to Trazodone and testosterone supplementation, the other times could not be linked to any specific cause.  He has seen multiple urologists in past including Dr Patel and Dr Dominguez.  Denies fever/chills, N/V, abd pain, urinary retention, dysuria, hematuria, or other acute complaints. No history of sickle cell disease or blood dyscrasias.          PAST MEDICAL & SURGICAL HISTORY:  OCD (obsessive compulsive disorder)      Priapism      Elbow injury  (R)      Anxiety      No significant past surgical history        FAMILY HISTORY:  FH: diabetes mellitus  father     No known  malignancy   SOCIAL HISTORY: Retired   Tobacco hx: smoker/nonsmoker   MEDICATIONS  (STANDING):  phenylephrine   100 mCg/mL NaCl 0.9% Injectable 1000 MICROGram(s) IntraCavernosal once    MEDICATIONS  (PRN):    Allergies    No Known Drug Allergies  enviornmental allergies (Sneezing)    Intolerances        REVIEW OF SYSTEMS: Pertinent positives and negatives as stated in HPI, otherwise negative    Vital signs  T(C): 36.4 (04-10-24 @ 09:57), Max: 36.4 (04-10-24 @ 09:57)  HR: 77 (04-10-24 @ 12:00)  BP: 135/79 (04-10-24 @ 12:00)  SpO2: 100% (04-10-24 @ 12:00)  Wt(kg): --    Physical Exam  Gen: NAD  Abd: Soft, NT, ND, no rebound tenderness or guarding  : + erect penis. unable to bend.   In the ER, the priapism was successfully detumesced with drainage and phenylephrine. (see separate procedure note)  NEURO: A&Ox3,     LABS:      04-10 @ 11:26    WBC 8.52  / Hct 48.2  / SCr 1.16     04-10    139  |  105  |  20  ----------------------------<  106<H>  4.3   |  22  |  1.16    Ca    9.9      10 Apr 2024 11:26    TPro  7.2  /  Alb  4.3  /  TBili  0.4  /  DBili  x   /  AST  38  /  ALT  37  /  AlkPhos  61  04-10      Urinalysis Basic - ( 10 Apr 2024 11:26 )    Color: x / Appearance: x / SG: x / pH: x  Gluc: 106 mg/dL / Ketone: x  / Bili: x / Urobili: x   Blood: x / Protein: x / Nitrite: x   Leuk Esterase: x / RBC: x / WBC x   Sq Epi: x / Non Sq Epi: x / Bacteria: x   [Chest Tightness] : no chest tightness [Wheezing] : no wheezing [A.M. Dry Mouth] : no a.m. dry mouth [Chest Discomfort] : no chest discomfort [Headache] : no headache [Dizziness] : no dizziness

## 2024-04-10 NOTE — ED ADULT NURSE NOTE - HISTORY OF COVID-19 VACCINATION
Lovely is a 47 Tiny Tolentino NP  [facility]    Subjective   Lovely is a 47 Hayden     No

## 2024-04-10 NOTE — CONSULT NOTE ADULT - NS ATTEND AMEND GEN_ALL_CORE FT
Priapism  S/p aspiration, irrigation/administration of phenylephrine  Detumescence maintained  Ok for dispo  f/u as outpt

## 2024-04-10 NOTE — CONSULT NOTE ADULT - NS_MD_PANP_GEN_ALL_CORE
Attending and PA/NP shared services statement (NON-critical care):
The patient is a 75y Male complaining of chest pain.

## 2024-04-10 NOTE — ED ADULT NURSE NOTE - NSFALLUNIVINTERV_ED_ALL_ED
Bed/Stretcher in lowest position, wheels locked, appropriate side rails in place/Call bell, personal items and telephone in reach/Instruct patient to call for assistance before getting out of bed/chair/stretcher/Non-slip footwear applied when patient is off stretcher/[place] to call system/Physically safe environment - no spills, clutter or unnecessary equipment/Purposeful proactive rounding/Room/bathroom lighting operational, light cord in reach

## 2024-04-10 NOTE — PROCEDURE NOTE - GENERAL PROCEDURE DETAILS
Penile corpora was aspirated with an 18 gauge needle and 2cc of phenylephrine was injected into the corpora for ultimate detumescence Penile corpora was aspirated with an 18 gauge needle and 2cc of phenylephrine 100 mcg/cc was injected into the corpora for ultimate detumescence

## 2024-04-10 NOTE — ED PROVIDER NOTE - CARE PROVIDER_API CALL
Sumanth Griffin  Urology  [address] [place] [id number]  Phone: [phone]  Fax: [phone]  Follow Up Time:

## 2024-04-10 NOTE — ED PROVIDER NOTE - NSFOLLOWUPINSTRUCTIONS_ED_ALL_ED_FT
Follow up with your PMD within 48-72 hrs. Show copies of your reports given to you. Take all of your medications as previously prescribed.   Please follow up with Urology in one week.   Avoid any sexual activity for one week.   Return for any concerns or worsening symptoms.      Sumanth Griffin  Urology  [address] [place] [id number]  Phone: [phone]

## 2024-04-10 NOTE — ED ADULT NURSE NOTE - OBJECTIVE STATEMENT
40 yo male presents to the ED AAox4 ambulatory with complaints of priapism x 5 am this morning that occurred spontaneous, no medications. Still able to void without difficulty. Pt has had numerous priapisms in the past requiring aspiration and phenylephrine for treatment. First time was secondary to Trazodone and testosterone supplementation, the other times could not be linked to any specific cause. Pt Denies headache, dizziness, vision changes, chest pain, shortness of breath, abdominal pain, nausea, vomiting, diarrhea, fevers, chills, dysuria, hematuria, recent illness travel or fall.

## 2024-04-10 NOTE — CONSULT NOTE ADULT - ASSESSMENT
40 y/o male with recurrent priapism. s/p aspriation and injection with phyenylephrine with good detumescence       -will re-evaluate in one hour  -ancef x 1  -  -  -  seen with urology attending Sumanth Griffin MD 40 y/o male with recurrent priapism. s/p aspriation and injection with phyenylephrine with good detumescence       -ancef x 1  -pt reevaluated in ED 2 hours after aspiration-> no priapism. +flaccid   -pt stable to be discharged home from urologic standpoint  -f/u with [facility]- Dr Sumanth Griffin [phone]  seen with urology attending Sumanth Griffin MD 38 y/o male with recurrent priapism. s/p bedside aspiration and injection with phenylephrine 200mcg with good detumescence       -ancef x 1  -pt reevaluated in ED 2 hours after aspiration-> no priapism. +flaccid   -pt stable to be discharged home from urologic standpoint  -f/u with [facility]- Dr Sumanth Griffin [phone]  seen with urology attending Sumanth Griffin MD 38 y/o male with recurrent priapism.     - pt with priapism - needs intervention with aspiration/irrigation  - performed at bedside, s/p aspiration and injection with phenylephrine 200mcg with good detumescence   -ancef x 1  -pt reevaluated in ED 2 hours after aspiration-> no priapism. +flaccid   -pt stable to be discharged home from urologic standpoint  -f/u with [facility]- Dr Sumanth Griffin [phone]  seen with urology attending Sumanth Griffin MD

## 2024-04-10 NOTE — ED PROVIDER NOTE - OBJECTIVE STATEMENT
39yom pmhx of Depression, and prior 5 priapism, currently on NO medications here with 6 hours of erection. pt reports waking up around 4am with erection and unable to go down. with pain around the penis and suprapubic pressure. able to urinate still. No fever or chills. No outpt Uro follow up

## 2024-04-10 NOTE — ED PROVIDER NOTE - ATTENDING APP SHARED VISIT CONTRIBUTION OF CARE
attending Judy: 39yM h/o depression, recurrent priapism, currently on no medications p/w priapism since 4am with associated pain and suprapubic pressure. Able to urinate. Exam as above. Will place urology consult, labs, pain control, review prior ED visit records, reassess

## 2024-04-15 ENCOUNTER — APPOINTMENT (OUTPATIENT)
Dept: UROLOGY | Facility: CLINIC | Age: 39
End: 2024-04-15
Payer: COMMERCIAL

## 2024-04-15 VITALS
DIASTOLIC BLOOD PRESSURE: 78 MMHG | WEIGHT: 180 LBS | OXYGEN SATURATION: 97 % | HEIGHT: 66 IN | BODY MASS INDEX: 28.93 KG/M2 | TEMPERATURE: 97.5 F | RESPIRATION RATE: 16 BRPM | SYSTOLIC BLOOD PRESSURE: 117 MMHG | HEART RATE: 63 BPM

## 2024-04-15 DIAGNOSIS — N48.33 PRIAPISM, DRUG-INDUCED: ICD-10-CM

## 2024-04-15 PROCEDURE — 99204 OFFICE O/P NEW MOD 45 MIN: CPT

## 2024-04-15 PROCEDURE — G2211 COMPLEX E/M VISIT ADD ON: CPT | Mod: NC,1L

## 2024-04-15 NOTE — HISTORY OF PRESENT ILLNESS
[FreeTextEntry1] : 39-year-old male with recurrent priapism.  He recently was in the ED for painful erection and required aspiration.  He is seen urologist in the past for similar issues and has had priapism from medications including trazodone and self dosed TRT  The patient states that when he had this issue in the past he was experimenting with his testosterone dosing and more recently had increased his dose to approximately 180 mg/week.  The patient required corporal aspiration as well as phenylephrine injection.  States that he now will occasionally get erections when laying down however they have not lasted longer than 4 hours and are not as painful as when he went to the ED.  He is urinating without any difficulty.
(4) excellent

## 2024-04-15 NOTE — ASSESSMENT
[FreeTextEntry1] : 39-year-old male with history of recurrent priapism recently seen in the ED for phenylephrine and aspiration.  It is likely brought on by his recent increase in his testosterone dosing which she self administers.  He has not had an injection in about 7 days.  However prior to this injection he was dosing about 180 mg/week.  The patient has shown interest in weaning off of his testosterone.  I discussed with him seeing a men's health specialist to do so.  Plan Testosterone Prolactin LH FSH Patient told to return to the office or emergency room if he develops another erection lasting greater than 4 hours I will call the patient with the results of his testosterone Refer to Dr. Terry Patel

## 2024-04-17 LAB
FSH SERPL-MCNC: 0.4 IU/L
HCT VFR BLD CALC: 42.1 %
HGB BLD-MCNC: 14 G/DL
LH SERPL-ACNC: <0.3 IU/L
MCHC RBC-ENTMCNC: 27.2 PG
MCHC RBC-ENTMCNC: 33.3 GM/DL
MCV RBC AUTO: 81.9 FL
PLATELET # BLD AUTO: 277 K/UL
PROLACTIN SERPL-MCNC: 7.5 NG/ML
RBC # BLD: 5.14 M/UL
RBC # FLD: 13.5 %
TESTOST SERPL-MCNC: 481 NG/DL
WBC # FLD AUTO: 8.1 K/UL

## 2024-04-19 DIAGNOSIS — R79.89 OTHER SPECIFIED ABNORMAL FINDINGS OF BLOOD CHEMISTRY: ICD-10-CM

## 2024-04-22 ENCOUNTER — NON-APPOINTMENT (OUTPATIENT)
Age: 39
End: 2024-04-22

## 2024-04-25 LAB — TESTOST SERPL-MCNC: 276 NG/DL

## 2024-06-26 ENCOUNTER — APPOINTMENT (OUTPATIENT)
Dept: UROLOGY | Facility: CLINIC | Age: 39
End: 2024-06-26

## 2024-08-03 ENCOUNTER — NON-APPOINTMENT (OUTPATIENT)
Age: 39
End: 2024-08-03

## 2024-08-26 NOTE — ED ADULT NURSE NOTE - NS ED NURSE RECORD ANOTHER HT AND WT
Since this was ordered by cardiology, will forward to Dr. Wheat for an alternative to torsemide.   Yes

## 2024-12-25 PROBLEM — F10.90 ALCOHOL USE: Status: ACTIVE | Noted: 2019-09-04

## 2025-01-17 ENCOUNTER — APPOINTMENT (OUTPATIENT)
Dept: UROLOGY | Facility: CLINIC | Age: 40
End: 2025-01-17
Payer: COMMERCIAL

## 2025-01-17 VITALS
DIASTOLIC BLOOD PRESSURE: 78 MMHG | OXYGEN SATURATION: 97 % | HEART RATE: 77 BPM | SYSTOLIC BLOOD PRESSURE: 123 MMHG | TEMPERATURE: 97.9 F

## 2025-01-17 DIAGNOSIS — R35.0 FREQUENCY OF MICTURITION: ICD-10-CM

## 2025-01-17 DIAGNOSIS — T38.7X5A ADVERSE EFFECT OF ANDROGENS AND ANABOLIC CONGENERS, INITIAL ENCOUNTER: ICD-10-CM

## 2025-01-17 DIAGNOSIS — F41.9 ANXIETY DISORDER, UNSPECIFIED: ICD-10-CM

## 2025-01-17 PROCEDURE — 99214 OFFICE O/P EST MOD 30 MIN: CPT | Mod: 25

## 2025-01-17 PROCEDURE — G2211 COMPLEX E/M VISIT ADD ON: CPT | Mod: NC

## 2025-01-17 PROCEDURE — 51798 US URINE CAPACITY MEASURE: CPT

## 2025-01-17 PROCEDURE — 51741 ELECTRO-UROFLOWMETRY FIRST: CPT

## 2025-01-18 LAB
APPEARANCE: CLEAR
BACTERIA: NEGATIVE /HPF
BILIRUBIN URINE: NEGATIVE
BLOOD URINE: NEGATIVE
CAST: 0 /LPF
COLOR: YELLOW
EPITHELIAL CELLS: 0 /HPF
GLUCOSE QUALITATIVE U: NEGATIVE MG/DL
KETONES URINE: ABNORMAL MG/DL
LEUKOCYTE ESTERASE URINE: NEGATIVE
MICROSCOPIC-UA: NORMAL
NITRITE URINE: NEGATIVE
PH URINE: 5.5
PROTEIN URINE: 30 MG/DL
RED BLOOD CELLS URINE: 2 /HPF
SPECIFIC GRAVITY URINE: >1.03
UROBILINOGEN URINE: 0.2 MG/DL
WHITE BLOOD CELLS URINE: 0 /HPF

## 2025-01-27 NOTE — ED ADULT NURSE NOTE - NS PRO AD BILL OF RIGHTS
I left a message for Mr/Mrs Ly concerning scheduling the TAVR procedure They had asked that I speak with their son Raul and I've not yet heard from him I left my contact information and encouraged them to call me with any questions  
Yes

## 2025-02-08 ENCOUNTER — EMERGENCY (EMERGENCY)
Facility: HOSPITAL | Age: 40
LOS: 0 days | Discharge: HOME | End: 2025-02-08
Attending: STUDENT IN AN ORGANIZED HEALTH CARE EDUCATION/TRAINING PROGRAM
Payer: OTHER MISCELLANEOUS

## 2025-02-08 VITALS
OXYGEN SATURATION: 98 % | HEART RATE: 65 BPM | RESPIRATION RATE: 18 BRPM | DIASTOLIC BLOOD PRESSURE: 84 MMHG | TEMPERATURE: 99 F | SYSTOLIC BLOOD PRESSURE: 127 MMHG

## 2025-02-08 VITALS
HEART RATE: 70 BPM | WEIGHT: 179.9 LBS | TEMPERATURE: 98 F | RESPIRATION RATE: 18 BRPM | HEIGHT: 66 IN | SYSTOLIC BLOOD PRESSURE: 139 MMHG | DIASTOLIC BLOOD PRESSURE: 97 MMHG | OXYGEN SATURATION: 100 %

## 2025-02-08 DIAGNOSIS — F42.9 OBSESSIVE-COMPULSIVE DISORDER, UNSPECIFIED: ICD-10-CM

## 2025-02-08 DIAGNOSIS — M25.521 PAIN IN RIGHT ELBOW: ICD-10-CM

## 2025-02-08 DIAGNOSIS — M25.511 PAIN IN RIGHT SHOULDER: ICD-10-CM
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
## 2025-02-08 DIAGNOSIS — M79.662 PAIN IN LEFT LOWER LEG: ICD-10-CM
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
## 2025-02-08 DIAGNOSIS — S43.004A UNSPECIFIED DISLOCATION OF RIGHT SHOULDER JOINT, INITIAL ENCOUNTER: ICD-10-CM

## 2025-02-08 PROCEDURE — 73080 X-RAY EXAM OF ELBOW: CPT | Mod: 26,RT

## 2025-02-08 PROCEDURE — 99284 EMERGENCY DEPT VISIT MOD MDM: CPT

## 2025-02-08 PROCEDURE — 73030 X-RAY EXAM OF SHOULDER: CPT | Mod: 26,RT

## 2025-02-08 PROCEDURE — 73590 X-RAY EXAM OF LOWER LEG: CPT | Mod: 26,LT

## 2025-02-08 RX ORDER — FLUVOXAMINE MALEATE 25 MG/1
1 TABLET, FILM COATED ORAL
Refills: 0 | DISCHARGE

## 2025-02-14 ENCOUNTER — APPOINTMENT (OUTPATIENT)
Dept: UROLOGY | Facility: CLINIC | Age: 40
End: 2025-02-14

## 2025-02-14 ENCOUNTER — APPOINTMENT (OUTPATIENT)
Dept: ULTRASOUND IMAGING | Facility: CLINIC | Age: 40
End: 2025-02-14

## 2025-02-14 ENCOUNTER — OUTPATIENT (OUTPATIENT)
Dept: OUTPATIENT SERVICES | Facility: HOSPITAL | Age: 40
LOS: 1 days | End: 2025-02-14
Payer: COMMERCIAL

## 2025-02-14 ENCOUNTER — EMERGENCY (EMERGENCY)
Facility: HOSPITAL | Age: 40
LOS: 1 days | Discharge: HOME | End: 2025-02-14
Attending: STUDENT IN AN ORGANIZED HEALTH CARE EDUCATION/TRAINING PROGRAM
Payer: SELF-PAY

## 2025-02-14 VITALS
RESPIRATION RATE: 19 BRPM | OXYGEN SATURATION: 94 % | SYSTOLIC BLOOD PRESSURE: 127 MMHG | TEMPERATURE: 98 F | DIASTOLIC BLOOD PRESSURE: 83 MMHG | HEART RATE: 62 BPM

## 2025-02-14 VITALS
HEART RATE: 66 BPM | TEMPERATURE: 97 F | WEIGHT: 200.84 LBS | SYSTOLIC BLOOD PRESSURE: 137 MMHG | DIASTOLIC BLOOD PRESSURE: 87 MMHG | RESPIRATION RATE: 18 BRPM | OXYGEN SATURATION: 100 %

## 2025-02-14 DIAGNOSIS — Z00.8 ENCOUNTER FOR OTHER GENERAL EXAMINATION: ICD-10-CM

## 2025-02-14 LAB
ALBUMIN SERPL ELPH-MCNC: 3.8 G/DL — SIGNIFICANT CHANGE UP (ref 3.3–5.2)
ALP SERPL-CCNC: 45 U/L — SIGNIFICANT CHANGE UP (ref 40–120)
ALT FLD-CCNC: 35 U/L — SIGNIFICANT CHANGE UP
ANION GAP SERPL CALC-SCNC: 11 MMOL/L — SIGNIFICANT CHANGE UP (ref 5–17)
APTT BLD: 28 SEC — SIGNIFICANT CHANGE UP (ref 24.5–35.6)
AST SERPL-CCNC: 32 U/L — SIGNIFICANT CHANGE UP
BASOPHILS # BLD AUTO: 0.04 K/UL — SIGNIFICANT CHANGE UP (ref 0–0.2)
BASOPHILS NFR BLD AUTO: 0.5 % — SIGNIFICANT CHANGE UP (ref 0–2)
BILIRUB SERPL-MCNC: 0.5 MG/DL — SIGNIFICANT CHANGE UP (ref 0.4–2)
BUN SERPL-MCNC: 16.1 MG/DL — SIGNIFICANT CHANGE UP (ref 8–20)
CALCIUM SERPL-MCNC: 8.8 MG/DL — SIGNIFICANT CHANGE UP (ref 8.4–10.5)
CHLORIDE SERPL-SCNC: 103 MMOL/L — SIGNIFICANT CHANGE UP (ref 96–108)
CO2 SERPL-SCNC: 23 MMOL/L — SIGNIFICANT CHANGE UP (ref 22–29)
CREAT SERPL-MCNC: 0.83 MG/DL — SIGNIFICANT CHANGE UP (ref 0.5–1.3)
EGFR: 114 ML/MIN/1.73M2 — SIGNIFICANT CHANGE UP
EOSINOPHIL # BLD AUTO: 0.09 K/UL — SIGNIFICANT CHANGE UP (ref 0–0.5)
EOSINOPHIL NFR BLD AUTO: 1.1 % — SIGNIFICANT CHANGE UP (ref 0–6)
GLUCOSE SERPL-MCNC: 78 MG/DL — SIGNIFICANT CHANGE UP (ref 70–99)
HCT VFR BLD CALC: 38.1 % — LOW (ref 39–50)
HGB BLD-MCNC: 12.9 G/DL — LOW (ref 13–17)
IMM GRANULOCYTES # BLD AUTO: 0.03 K/UL — SIGNIFICANT CHANGE UP (ref 0–0.07)
IMM GRANULOCYTES NFR BLD AUTO: 0.4 % — SIGNIFICANT CHANGE UP (ref 0–0.9)
INR BLD: 1.02 RATIO — SIGNIFICANT CHANGE UP (ref 0.85–1.16)
LYMPHOCYTES # BLD AUTO: 2.37 K/UL — SIGNIFICANT CHANGE UP (ref 1–3.3)
LYMPHOCYTES NFR BLD AUTO: 28.1 % — SIGNIFICANT CHANGE UP (ref 13–44)
MCHC RBC-ENTMCNC: 27.2 PG — SIGNIFICANT CHANGE UP (ref 27–34)
MCHC RBC-ENTMCNC: 33.9 G/DL — SIGNIFICANT CHANGE UP (ref 32–36)
MCV RBC AUTO: 80.4 FL — SIGNIFICANT CHANGE UP (ref 80–100)
MONOCYTES # BLD AUTO: 0.52 K/UL — SIGNIFICANT CHANGE UP (ref 0–0.9)
MONOCYTES NFR BLD AUTO: 6.2 % — SIGNIFICANT CHANGE UP (ref 2–14)
NEUTROPHILS # BLD AUTO: 5.37 K/UL — SIGNIFICANT CHANGE UP (ref 1.8–7.4)
NEUTROPHILS NFR BLD AUTO: 63.7 % — SIGNIFICANT CHANGE UP (ref 43–77)
NRBC # BLD AUTO: 0 K/UL — SIGNIFICANT CHANGE UP (ref 0–0)
NRBC # FLD: 0 K/UL — SIGNIFICANT CHANGE UP (ref 0–0)
NRBC BLD AUTO-RTO: 0 /100 WBCS — SIGNIFICANT CHANGE UP (ref 0–0)
PLATELET # BLD AUTO: 221 K/UL — SIGNIFICANT CHANGE UP (ref 150–400)
PMV BLD: 11.1 FL — SIGNIFICANT CHANGE UP (ref 7–13)
POTASSIUM SERPL-MCNC: 4.2 MMOL/L — SIGNIFICANT CHANGE UP (ref 3.5–5.3)
POTASSIUM SERPL-SCNC: 4.2 MMOL/L — SIGNIFICANT CHANGE UP (ref 3.5–5.3)
PROT SERPL-MCNC: 6.5 G/DL — LOW (ref 6.6–8.7)
PROTHROM AB SERPL-ACNC: 11.5 SEC — SIGNIFICANT CHANGE UP (ref 9.9–13.4)
RBC # BLD: 4.74 M/UL — SIGNIFICANT CHANGE UP (ref 4.2–5.8)
RBC # FLD: 13 % — SIGNIFICANT CHANGE UP (ref 10.3–14.5)
SODIUM SERPL-SCNC: 137 MMOL/L — SIGNIFICANT CHANGE UP (ref 135–145)
WBC # BLD: 8.42 K/UL — SIGNIFICANT CHANGE UP (ref 3.8–10.5)
WBC # FLD AUTO: 8.42 K/UL — SIGNIFICANT CHANGE UP (ref 3.8–10.5)

## 2025-02-14 PROCEDURE — 93970 EXTREMITY STUDY: CPT | Mod: 26

## 2025-02-14 PROCEDURE — 99283 EMERGENCY DEPT VISIT LOW MDM: CPT

## 2025-02-14 PROCEDURE — 99284 EMERGENCY DEPT VISIT MOD MDM: CPT

## 2025-02-14 PROCEDURE — 85025 COMPLETE CBC W/AUTO DIFF WBC: CPT

## 2025-02-14 PROCEDURE — 85730 THROMBOPLASTIN TIME PARTIAL: CPT

## 2025-02-14 PROCEDURE — 93970 EXTREMITY STUDY: CPT

## 2025-02-14 PROCEDURE — 80053 COMPREHEN METABOLIC PANEL: CPT

## 2025-02-14 PROCEDURE — 36415 COLL VENOUS BLD VENIPUNCTURE: CPT

## 2025-02-14 PROCEDURE — 85610 PROTHROMBIN TIME: CPT

## 2025-02-14 RX ORDER — APIXABAN 5 MG/1
1 TABLET, FILM COATED ORAL
Qty: 1 | Refills: 1
Start: 2025-02-14

## 2025-02-14 RX ORDER — APIXABAN 5 MG/1
10 TABLET, FILM COATED ORAL ONCE
Refills: 0 | Status: COMPLETED | OUTPATIENT
Start: 2025-02-14 | End: 2025-02-14

## 2025-02-14 RX ORDER — ACETAMINOPHEN 160 MG/5ML
975 SUSPENSION ORAL ONCE
Refills: 0 | Status: COMPLETED | OUTPATIENT
Start: 2025-02-14 | End: 2025-02-14

## 2025-02-14 RX ORDER — OXYCODONE HYDROCHLORIDE 30 MG/1
2.5 TABLET ORAL ONCE
Refills: 0 | Status: DISCONTINUED | OUTPATIENT
Start: 2025-02-14 | End: 2025-02-14

## 2025-02-14 RX ADMIN — ACETAMINOPHEN 975 MILLIGRAM(S): 160 SUSPENSION ORAL at 22:46

## 2025-02-14 RX ADMIN — OXYCODONE HYDROCHLORIDE 2.5 MILLIGRAM(S): 30 TABLET ORAL at 22:46

## 2025-02-14 RX ADMIN — APIXABAN 10 MILLIGRAM(S): 5 TABLET, FILM COATED ORAL at 23:56

## 2025-02-14 NOTE — ED PROVIDER NOTE - PATIENT PORTAL LINK FT
You can access the FollowMyHealth Patient Portal offered by [facility] by registering at the following website: [email or web address]. By joining [employer]’s FollowMyHealth portal, you will also be able to view your health information using other applications (apps) compatible with our system.

## 2025-02-14 NOTE — ED PROVIDER NOTE - NSFOLLOWUPINSTRUCTIONS_ED_ALL_ED_FT
** You have a deep vein thrombosis.     ** You are prescribed ELIQUIS. Take as directed by your pharmacists.     ** Follow up with your primary care doctor in the next 72 hours.     ** Go to the nearest Emergency Department if you experience any new or concerning symptoms, such as:   - worsening pain  - chest pain  - difficulty breathing  - passing out  - unable to eat or drink  - unable to move or feel part of your body  - fever, chills

## 2025-02-14 NOTE — ED ADULT TRIAGE NOTE - CHIEF COMPLAINT QUOTE
Pt walks in for triage c/o + US showing DVTs in left ankle. Pt had a ruptured achillies and dislocated shoulder 2/8 and MD requested US based on presentation of ankle. Pt denies numbness/tingling and denies discoloration. Pt has boot on affected leg.

## 2025-02-14 NOTE — ED PROVIDER NOTE - PROGRESS NOTE DETAILS
Mireille Cobos MD, Attending   Well appearing, not tachycardic, no thypoxic, no chest pain, minimal likelihood of PE. Agree to Tx with Eliquis and oupt follow up with PMD.

## 2025-02-14 NOTE — ED PROVIDER NOTE - CLINICAL SUMMARY MEDICAL DECISION MAKING FREE TEXT BOX
MATEUS DOMINGO is a 40M with no PMHx presenting for left lower extremity edema i/s/o more sedentary lifestyle since falling on 2/8/2025. Pt reports he had a DVT u/s scan earlier today which was concerning for a left soleal vein thrombus. Pt reports no SOB, cp, or palpitations.    PLAN:  1. Left lower leg edema  - Unable to view DVT scan results in [facility]. Will consider repeating study  - Plan to start Lovenox in ED for DVT treatment  - Will discuss ED return precautions with pt including increased swelling, SOB, cp, palpitations    Disposition: plan to d/c home after further work-up MATEUS DOMINGO is a 40M with no PMHx presenting for left lower extremity edema i/s/o more sedentary lifestyle since falling on 2/8/2025. Pt reports he had a DVT u/s scan earlier today which was concerning for a left soleal vein thrombus. Pt reports no SOB, cp, or palpitations.    PLAN:  1. Left lower leg edema  - Unable to view DVT scan results in [facility]. Will consider repeating study  - Plan to start Lovenox in ED for DVT treatment  - Will discuss ED return precautions with pt including increased swelling, SOB, cp, palpitations    Disposition: plan to d/c home after further work-up    ---------  Mireille Cobos MD, Attending\    [facility] portal ultrasound lower extremity venous duplex complete bilateral,  Performed on February 14, 2025 melts occlusive thrombus within the left soleal vein.    Gen: Well appearing in NAD   Head: NC/AT  Neck: trachea midline  Resp:  No distress  Ext: no deformities  Neuro:  A&O appears non focal  Skin:  Warm and dry as visualized  Psych:  Normal affect and mood    ddx includes, but is not limited to the following:  plan:  update: see progress notes MATEUS DOMINGO is a 40M with no PMHx presenting for left lower extremity edema i/s/o more sedentary lifestyle since falling on 2/8/2025. Pt reports he had a DVT u/s scan earlier today which was concerning for a left soleal vein thrombus. Pt reports no SOB, cp, or palpitations.    PLAN:  1. Left lower leg edema  - Unable to view DVT scan results in [facility]. Will consider repeating study  - Plan to start Lovenox in ED for DVT treatment  - Will discuss ED return precautions with pt including increased swelling, SOB, cp, palpitations    Disposition: plan to d/c home after further work-up    ---------  Mireille Cobos MD, Attending\  40M no sig pmhx pw diagnosed DVT on left leg. Pt fell on 2/8 and was found to have right shoulder dislocation dn achilles tendon rupture. Pt followed up with orthopedist today who was suspicious pt had DVT and referred pt to outpatient imaging, which was completed at ~7:00pm. Pt received call stating he has DVT and was referred to ED for management.    US lower extremity duplex complete bilateral performed feb 2/14 impression: Right- no right calf vein thrombosis is detected. Left- normal compressibility of the left common femoral and femoral and popliteal veins. Doppler examination shows normal spontaneous and phasic flos. Occlusive thrombus within the left soleal vein.     Gen: Well appearing in NAD   Head: NC/AT  Neck: trachea midline  Resp:  No distress  Ext: minimal L leg swelling and tenderness at achilles and calf. Pt has walking boot provided by orthopedist  Neuro:  A&O appears non focal  Skin:  Warm and dry as visualized  Psych:  Normal affect and mood  ddx includes, but is not limited to the following: DVT, vs superficial thrombosis.   plan: check platelets and inr, start eliquis.   update: see progress notes

## 2025-02-14 NOTE — ED PROVIDER NOTE - PHYSICAL EXAMINATION
VITALS:   T(C): 36.3 (02-14-25 @ 19:33), Max: 36.3 (02-14-25 @ 19:33)  HR: 66 (02-14-25 @ 19:33) (66 - 66)  BP: 137/87 (02-14-25 @ 19:33) (137/87 - 137/87)  RR: 18 (02-14-25 @ 19:33) (18 - 18)  SpO2: 100% (02-14-25 @ 19:33) (100% - 100%)    GENERAL: NAD, lying in stretcher comfortably. Right arm in a sling, left leg in a boot.  HEAD: Atraumatic, Normocephalic  EYES: EOMI, sclera clear  CHEST/LUNG: Clear to auscultation bilaterally; No rales, rhonchi, wheezing, or rubs. Unlabored respirations  HEART: Regular rate and rhythm; No murmurs, rubs, or gallops  EXTREMITIES:  2+ pitting edema in left lower extremity and no edema in right lower extremity, 2+ dorsalis pedis pulses b/l, right foot more arched than left foot, no upper extremity edema b/l  NERVOUS SYSTEM:  A&Ox4, pt reports decreased ability to plantarflex with left foot compared to right  SKIN: bruising present around left ankle  Psych: Normal speech, normal behavior, normal affect

## 2025-02-14 NOTE — ED ADULT NURSE NOTE - NS ED NURSE LEVEL OF CONSCIOUSNESS ORIENTATION
Patient Education       Well Child Exam 6 Years   About this topic   Your child's 5-year well child exam is a visit with the doctor to check your child's health. The doctor measures your child's weight, height, and head size. The doctor plots these numbers on a growth curve. The growth curve gives a picture of your child's growth at each visit. The doctor may listen to your child's heart, lungs, and belly. Your doctor will do a full exam of your child from the head to the toes. The doctor may check your child's hearing and vision.  Your child may also need shots or blood tests during this visit.  General   Growth and Development   Your doctor will ask you how your child is developing. The doctor will focus on the skills that most children your child's age are expected to do. During this time of your child's life, here are some things you can expect.  Movement - Your child may:  Be able to skip  Hop and stand on one foot  Use fork and spoon well. May also be able to use a table knife.  Draw circles, squares, and some letters  Get dressed without help  Be able to swing and do a somersault  Hearing, seeing, and talking - Your child will likely:  Be able to tell a simple story  Know name and address  Speak in longer sentence  Understand concepts of counting, same and different, and time  Know many letters and numbers  Feelings and behavior - Your child will likely:  Like to sing, dance, and act  Know the difference between what is and is not real  Want to make friends happy  Have a good imagination  Work together with others  Be better at following rules. Help your child learn what the rules are by having rules that do not change. Make your rules the same all the time. Use a short time out to discipline your child.  Feeding - Your child:  Can drink lowfat or fat-free milk. Limit your child to 2 to 3 cups (480 to 720 mL) of milk each day.  Will be eating 3 meals and 1 to 2 snacks a day. Make sure to give your child the  right size portions and healthy choices.  Should be given a variety of healthy foods. Many children like to help cook and make food fun.  Should have no more than 4 to 6 ounces (120 to 180 mL) of fruit juice a day. Do not give your child soda.  Should eat meals as a part of the family. Turn the TV and cell phone off while eating. Talk about your day, rather than focusing on what your child is eating.  Sleep - Your child:  Is likely sleeping about 10 hours in a row at night. Try to have the same routine before bedtime. Read to your child each night before bed. Have your child brush teeth before going to bed as well.  May have bad dreams or wake up at night.  Shots - It is important for your child to get shots on time. This protects your child from very serious illnesses like brain or lung infections.  Your child may need some shots if they were missed earlier.  Your child can get their last set of shots before they start school. This may include:  DTaP or diphtheria, tetanus, and pertussis vaccine  MMR vaccine or measles, mumps, and rubella  IPV or polio vaccine  Varicella or chickenpox vaccine  Flu or influenza vaccine  Your child may get some of these combined into one shot. This lowers the number of shots your child may get and yet keeps them protected.  Help for Parents   Play with your child.  Go outside as often as you can. Visit playgrounds. Give your child a tricycle or bicycle to ride. Make sure your child wears a helmet when using anything with wheels like skates, skateboard, bike, etc.  Play simple games. Teach your child how to take turns and share.  Make a game out of household chores. Sort clothes by color or size. Race to  toys.  Read to your child. Have your child tell the story back to you. Find word that rhyme or start with the same letter.  Give your child paper, safe scissors, glue, and other craft supplies. Help your child make a project.  Here are some things you can do to help keep your  child safe and healthy.  Have your child brush teeth 2 to 3 times each day. Your child should also see a dentist 1 to 2 times each year for a cleaning and checkup.  Put sunscreen with a SPF30 or higher on your child at least 15 to 30 minutes before going outside. Put more sunscreen on after about 2 hours.  Do not allow anyone to smoke in your home or around your child.  Have the right size car seat for your child and use it every time your child is in the car. Seats with a harness are safer than just a booster seat with a belt.  Take extra care around water. Make sure your child cannot get to pools or spas. Consider teaching your child to swim.  Never leave your child alone. Do not leave your child in the car or at home alone, even for a few minutes.  Protect your child from gun injuries. If you have a gun, use a trigger lock. Keep the gun locked up and the bullets kept in a separate place.  Limit screen time for children to 1 to 2 hours per day. This means TV, phones, computers, tablets, or video games.  Parents need to think about:  Enrolling your child in school  How to encourage your child to be physically active  Talking to your child about strangers, unwanted touch, and keeping private parts safe  Talking to your child in simple terms about differences between boys and girls and where babies come from  Having your child help with some family chores to encourage responsibility within the family  The next well child visit will most likely be when your child is 7 years old. At this visit your doctor may:  Do a full check up on your child  Talk about limiting screen time for your child, how well your child is eating, and how to promote physical activity  Talk about discipline and how to correct your child  Talk about getting your child ready for school  When do I need to call the doctor?   Fever of 100.4°F (38°C) or higher  Has trouble eating, sleeping, or using the toilet  Does not respond to others  You are  worried about your child's development  Where can I learn more?   Centers for Disease Control and Prevention  http://www.cdc.gov/vaccines/parents/downloads/milestones-tracker.pdf   Centers for Disease Control and Prevention  https://www.cdc.gov/ncbddd/actearly/milestones/milestones-5yr.html   Kids Health  https://kidshealth.org/en/parents/checkup-5yrs.html?ref=search   Last Reviewed Date   2019-09-12  Consumer Information Use and Disclaimer   This information is not specific medical advice and does not replace information you receive from your health care provider. This is only a brief summary of general information. It does NOT include all information about conditions, illnesses, injuries, tests, procedures, treatments, therapies, discharge instructions or life-style choices that may apply to you. You must talk with your health care provider for complete information about your health and treatment options. This information should not be used to decide whether or not to accept your health care providers advice, instructions or recommendations. Only your health care provider has the knowledge and training to provide advice that is right for you.  Copyright   Copyright © 2021 [employer] and its affiliates and/or licensors. All rights reserved.    A 4 year old child who has outgrown the forward facing, internal harness system shall be restrained in a belt positioning child booster seat.  If you have an active [facility] account, please look for your well child questionnaire to come to your [facility] account before your next well child visit.   Oriented - self; Oriented - place; Oriented - time

## 2025-02-14 NOTE — ED PROVIDER NOTE - OBJECTIVE STATEMENT
MATEUS DOMINGO is a 40M with no PMHx presenting for left lower leg edema. Pt fell on 2/8/2025 and was examined in the ED where he was found to have a right shoulder dislocation and possible Achilles tendon rupture. Pt went home and rested and took ibuprofen for pain management until his appointment with an orthopedist earlier today. The orthopedist was concerned for Achilles tendon rupture but said pt needs an MRI to confirm the injury. Orthopedist was also concerned for DVT, and pt had a DVT u/s study performed at a [facility] this evening at 7:09 pm. DVT study was concerning for a left soleal vein thrombus. Pt presented to ED for further DVT management. Pt denied headache, dizziness, cp, palpitations, SOB, n/v. Patient has no known medical history or family history of clotting disorders, DVTs, or PEs.

## 2025-02-21 ENCOUNTER — APPOINTMENT (OUTPATIENT)
Dept: UROLOGY | Facility: CLINIC | Age: 40
End: 2025-02-21

## 2025-02-28 ENCOUNTER — APPOINTMENT (OUTPATIENT)
Dept: MRI IMAGING | Facility: CLINIC | Age: 40
End: 2025-02-28

## 2025-02-28 ENCOUNTER — OUTPATIENT (OUTPATIENT)
Dept: OUTPATIENT SERVICES | Facility: HOSPITAL | Age: 40
LOS: 1 days | End: 2025-02-28
Payer: COMMERCIAL

## 2025-02-28 ENCOUNTER — APPOINTMENT (OUTPATIENT)
Dept: RADIOLOGY | Facility: CLINIC | Age: 40
End: 2025-02-28
Payer: COMMERCIAL

## 2025-02-28 ENCOUNTER — OUTPATIENT (OUTPATIENT)
Dept: OUTPATIENT SERVICES | Facility: HOSPITAL | Age: 40
LOS: 1 days | End: 2025-02-28
Payer: SELF-PAY

## 2025-02-28 DIAGNOSIS — Z00.8 ENCOUNTER FOR OTHER GENERAL EXAMINATION: ICD-10-CM

## 2025-02-28 PROCEDURE — 73718 MRI LOWER EXTREMITY W/O DYE: CPT | Mod: 26,LT

## 2025-02-28 PROCEDURE — 73221 MRI JOINT UPR EXTREM W/O DYE: CPT

## 2025-02-28 PROCEDURE — 73221 MRI JOINT UPR EXTREM W/O DYE: CPT | Mod: 26,RT

## 2025-02-28 PROCEDURE — 70260 X-RAY EXAM OF SKULL: CPT | Mod: 26

## 2025-02-28 PROCEDURE — 73721 MRI JNT OF LWR EXTRE W/O DYE: CPT | Mod: 26,LT

## 2025-02-28 PROCEDURE — 73721 MRI JNT OF LWR EXTRE W/O DYE: CPT

## 2025-02-28 PROCEDURE — 70260 X-RAY EXAM OF SKULL: CPT

## 2025-02-28 PROCEDURE — 73718 MRI LOWER EXTREMITY W/O DYE: CPT
